# Patient Record
Sex: MALE | Race: WHITE | NOT HISPANIC OR LATINO | Employment: OTHER | URBAN - NONMETROPOLITAN AREA
[De-identification: names, ages, dates, MRNs, and addresses within clinical notes are randomized per-mention and may not be internally consistent; named-entity substitution may affect disease eponyms.]

---

## 2017-06-13 ENCOUNTER — OFFICE VISIT (OUTPATIENT)
Dept: NEUROSURGERY | Facility: CLINIC | Age: 77
End: 2017-06-13

## 2017-06-13 VITALS — WEIGHT: 198 LBS | HEIGHT: 73 IN | BODY MASS INDEX: 26.24 KG/M2

## 2017-06-13 DIAGNOSIS — M50.30 DDD (DEGENERATIVE DISC DISEASE), CERVICAL: Primary | ICD-10-CM

## 2017-06-13 DIAGNOSIS — M50.30 BULGING OF CERVICAL INTERVERTEBRAL DISC: ICD-10-CM

## 2017-06-13 DIAGNOSIS — M25.78 OSTEOPHYTE OF CERVICAL SPINE: ICD-10-CM

## 2017-06-13 PROCEDURE — 99213 OFFICE O/P EST LOW 20 MIN: CPT | Performed by: NEUROLOGICAL SURGERY

## 2017-06-13 RX ORDER — SULFAMETHOXAZOLE AND TRIMETHOPRIM 800; 160 MG/1; MG/1
TABLET ORAL
COMMUNITY
Start: 2017-05-17 | End: 2017-08-08 | Stop reason: HOSPADM

## 2017-06-13 RX ORDER — ROSUVASTATIN CALCIUM 40 MG/1
TABLET, COATED ORAL
COMMUNITY
Start: 2017-06-12

## 2017-06-13 RX ORDER — CARVEDILOL 12.5 MG/1
12.5 TABLET ORAL DAILY
COMMUNITY

## 2017-06-13 RX ORDER — CARVEDILOL 25 MG/1
TABLET ORAL
COMMUNITY
Start: 2017-05-08 | End: 2017-06-13

## 2017-06-13 NOTE — PATIENT INSTRUCTIONS
Degenerative Disk Disease  Degenerative disk disease is a condition caused by the changes that occur in spinal disks as you grow older. Spinal disks are soft and compressible disks located between the bones of your spine (vertebrae). These disks act like shock absorbers. Degenerative disk disease can affect the whole spine. However, the neck and lower back are most commonly affected. Many changes can occur in the spinal disks with aging, such as:  · The spinal disks may dry and shrink.  · Small tears may occur in the tough, outer covering of the disk (annulus).  · The disk space may become smaller due to loss of water.  · Abnormal growths in the bone (spurs) may occur. This can put pressure on the nerve roots exiting the spinal canal, causing pain.  · The spinal canal may become narrowed.  RISK FACTORS   · Being overweight.  · Having a family history of degenerative disk disease.  · Smoking.  · There is increased risk if you are doing heavy lifting or have a sudden injury.  SIGNS AND SYMPTOMS   Symptoms vary from person to person and may include:  · Pain that varies in intensity. Some people have no pain, while others have severe pain. The location of the pain depends on the part of your backbone that is affected.  ¨ You will have neck or arm pain if a disk in the neck area is affected.  ¨ You will have pain in your back, buttocks, or legs if a disk in the lower back is affected.  · Pain that becomes worse while bending, reaching up, or with twisting movements.  · Pain that may start gradually and then get worse as time passes. It may also start after a major or minor injury.  · Numbness or tingling in the arms or legs.  DIAGNOSIS   Your health care provider will ask you about your symptoms and about activities or habits that may cause the pain. He or she may also ask about any injuries, diseases, or treatments you have had. Your health care provider will examine you to check for the range of movement that is  possible in the affected area, to check for strength in your extremities, and to check for sensation in the areas of the arms and legs supplied by different nerve roots. You may also have:   · An X-ray of the spine.  · Other imaging tests, such as MRI.  TREATMENT   Your health care provider will advise you on the best plan for treatment. Treatment may include:  · Medicines.  · Rehabilitation exercises.  HOME CARE INSTRUCTIONS   · Follow proper lifting and walking techniques as advised by your health care provider.  · Maintain good posture.  · Exercise regularly as advised by your health care provider.  · Perform relaxation exercises.  · Change your sitting, standing, and sleeping habits as advised by your health care provider.  · Change positions frequently.  · Lose weight or maintain a healthy weight as advised by your health care provider.  · Do not use any tobacco products, including cigarettes, chewing tobacco, or electronic cigarettes. If you need help quitting, ask your health care provider.  · Wear supportive footwear.  · Take medicines only as directed by your health care provider.  SEEK MEDICAL CARE IF:   · Your pain does not go away within 1-4 weeks.  · You have significant appetite or weight loss.  SEEK IMMEDIATE MEDICAL CARE IF:   · Your pain is severe.  · You notice weakness in your arms, hands, or legs.  · You begin to lose control of your bladder or bowel movements.  · You have fevers or night sweats.  MAKE SURE YOU:   · Understand these instructions.  · Will watch your condition.  · Will get help right away if you are not doing well or get worse.     This information is not intended to replace advice given to you by your health care provider. Make sure you discuss any questions you have with your health care provider.     Document Released: 10/14/2008 Document Revised: 01/08/2016 Document Reviewed: 04/21/2015  [email or web address] Interactive Patient Education ©2017 [email or web address]    Neck Exercises  Neck  exercises can be important for many reasons:   · They can help you to improve and maintain flexibility in your neck. This can be especially important as you age.  · They can help to make your neck stronger. This can make movement easier.  · They can reduce or prevent neck pain.  · They may help your upper back.  Ask your health care provider which neck exercises would be best for you.  EXERCISES TO IMPROVE NECK FLEXIBILITY  Neck Stretch  Repeat this exercise 3-5 times.   1. Do this exercise while standing or while sitting in a chair.  2. Place your feet flat on the floor, shoulder-width apart.  3. Slowly turn your head to the right. Turn it all the way to the right so you can look over your right shoulder. Do not tilt or tip your head.  4. Hold this position for 10-30 seconds.  5. Slowly turn your head to the left, to look over your left shoulder.  6. Hold this position for 10-30 seconds.  Neck Retraction  Repeat this exercise 8-10 times. Do this 3-4 times a day or as told by your health care provider.  1. Do this exercise while standing or while sitting in a sturdy chair.  2. Look straight ahead. Do not bend your neck.  3. Use your fingers to push your chin backward. Do not bend your neck for this movement. Continue to face straight ahead. If you are doing the exercise properly, you will feel a slight sensation in your throat and a stretch at the back of your neck.  4. Hold the stretch for 1-2 seconds. Relax and repeat.  EXERCISES TO IMPROVE NECK STRENGTH  Neck Press   Repeat this exercise 10 times. Do it first thing in the morning and right before bed or as told by your health care provider.  1. Lie on your back on a firm bed or on the floor with a pillow under your head.  2. Use your neck muscles to push your head down on the pillow and straighten your spine.  3. Hold the position as well as you can. Keep your head facing up and your chin tucked.  4. Slowly count to 5 while holding this position.  5. Relax for a  few seconds. Then repeat.  Isometric Strengthening  Do a full set of these exercises 2 times a day or as told by your health care provider.  1. Sit in a supportive chair and place your hand on your forehead.  2. Push forward with your head and neck while pushing back with your hand. Hold for 10 seconds.  3. Relax. Then repeat the exercise 3 times.  4. Next, do the sequence again, this time putting your hand against the back of your head. Use your head and neck to push backward against the hand pressure.  5. Finally, do the same exercise on either side of your head, pushing sideways against the pressure of your hand.  Prone Head Lifts  Repeat this exercise 5 times. Do this 2 times a day or as told by your health care provider.  1. Lie face-down, resting on your elbows so that your chest and upper back are raised.  2. Start with your head facing downward, near your chest. Position your chin either on or near your chest.  3. Slowly lift your head upward. Lift until you are looking straight ahead. Then continue lifting your head as far back as you can stretch.  4. Hold your head up for 5 seconds. Then slowly lower it to your starting position.  Supine Head Lifts  Repeat this exercise 8-10 times. Do this 2 times a day or as told by your health care provider.  1. Lie on your back, bending your knees to point to the ceiling and keeping your feet flat on the floor.  2. Lift your head slowly off the floor, raising your chin toward your chest.  3. Hold for 5 seconds.  4. Relax and repeat.  Scapular Retraction  Repeat this exercise 5 times. Do this 2 times a day or as told by your health care provider.   1. Stand with your arms at your sides. Look straight ahead.  2. Slowly pull both shoulders backward and downward until you feel a stretch between your shoulder blades in your upper back.  3. Hold for 10-30 seconds.  4. Relax and repeat.  SEEK MEDICAL CARE IF:  · Your neck pain or discomfort gets much worse when you do an  exercise.  · Your neck pain or discomfort does not improve within 2 hours after you exercise.  If you have any of these problems, stop exercising right away. Do not do the exercises again unless your health care provider says that you can.  SEEK IMMEDIATE MEDICAL CARE IF:  · You develop sudden, severe neck pain. If this happens, stop exercising right away. Do not do the exercises again unless your health care provider says that you can.     This information is not intended to replace advice given to you by your health care provider. Make sure you discuss any questions you have with your health care provider.     Document Released: 11/28/2016 Document Reviewed: 11/28/2016  [employer] Interactive Patient Education ©2017 [employer]

## 2017-06-13 NOTE — PROGRESS NOTES
Subjective   Patient ID: Valente Vargas Jr. is a 76 y.o. male is being seen for consultation today at the request of Mitch Bedoya*  Chief Complaint: Left sided neck pain    History of Present Illness: The patient is a 76-year-old man with a complaint of left-sided neck pain for the past 3 months.  The pain is nonradicular.  It is not associated with weakness or numbness in the upper extremities or change in gait and the lower extremities.  Skin was done and was reported to the patient that it showed bone spurs.    I saw Mr. Vargas 1 year ago for symptoms of DVT instability and at that time lumbar myelogram showed advanced degenerative disc at L4 5 with prior lumbar fusion at L5-S1, an MRI scan of the head showing no evidence of hydrocephalus.    Review of Radiographic Studies:  MRI scan of cervical spine on 5/18/17 shows degenerative disc change at C4 5 and C5 6 with mild central bulge, but no spinal canal stenosis and no significant foraminal stenosis on either side.  Degenerative changes are also present at C6 7 and C7-T1.    The following portions of the patient's history were reviewed, updated as appropriate and approved: allergies, current medications, past family history, past medical history, past social history, past surgical history, review of systems and problem list.  Review of Systems   Constitutional: Negative for activity change, appetite change, chills, diaphoresis, fatigue, fever and unexpected weight change.   HENT: Negative for congestion, dental problem, drooling, ear discharge, ear pain, facial swelling, hearing loss, mouth sores, nosebleeds, postnasal drip, rhinorrhea, sinus pressure, sneezing, sore throat, tinnitus, trouble swallowing and voice change.    Eyes: Negative for photophobia, pain, discharge, redness, itching and visual disturbance.   Respiratory: Negative for apnea, cough, choking, chest tightness, shortness of breath, wheezing and stridor.    Cardiovascular: Negative  for chest pain, palpitations and leg swelling.   Gastrointestinal: Negative for abdominal distention, abdominal pain, anal bleeding, blood in stool, constipation, diarrhea, nausea, rectal pain and vomiting.   Endocrine: Negative for cold intolerance, heat intolerance, polydipsia, polyphagia and polyuria.   Genitourinary: Negative for decreased urine volume, difficulty urinating, dysuria, enuresis, flank pain, frequency, genital sores, hematuria and urgency.   Musculoskeletal: Positive for back pain, neck pain and neck stiffness. Negative for arthralgias, gait problem, joint swelling and myalgias.   Skin: Negative for color change, pallor, rash and wound.   Allergic/Immunologic: Negative for environmental allergies, food allergies and immunocompromised state.   Neurological: Positive for weakness and headaches. Negative for dizziness, tremors, seizures, syncope, facial asymmetry, speech difficulty, light-headedness and numbness.   Hematological: Negative for adenopathy. Does not bruise/bleed easily.   Psychiatric/Behavioral: Negative for agitation, behavioral problems, confusion, decreased concentration, dysphoric mood, hallucinations, self-injury, sleep disturbance and suicidal ideas. The patient is not nervous/anxious and is not hyperactive.    All other systems reviewed and are negative.      Objective     NEUROLOGICAL EXAMINATION:      MENTAL STATUS:  Alert and oriented.  Speech intact.  Recent and remote memory intact.      CRANIAL NERVES:  Cranial nerve II:  Visual fields are full to confrontation.  Cranial nerves III, IV and VI:  PERRLADC.  Extraocular movements are intact.  Nystagmus is not present.  Cranial nerve V:  Facial sensation is intact to light touch.  Cranial nerve VII:  Muscles of facial expression reveal no asymmetry.  Cranial nerve VIII:  Hearing is intact to finger rub bilaterally.  Cranial nerves IX and X:  Palate elevates symmetrically.  Cranial nerve XI:  Shoulder shrug is intact.  Cranial  nerve XII:  Tongue is midline without evidence of atrophy or fasciculation.    MUSCULOSKELETAL:  No focal tenderness to palpation of the neck.    MOTOR:  Deltoid, biceps, triceps, and  strength intact.  No hand atrophy.  Hip flexion, knee extension, ankle dorsiflexion and plantar flexion intact.  He uses a cane when walking for stability    SENSATION: Intact to touch upper and lower extremities.  Position sense intact.    REFLEXES:  DTR  absent in the upper extremities.    Assessment   Degenerative cervical disc C4 5 and C5 6.  No radiculopathy, myelopathy, or or instability.       Plan   [facility] is the primary treatment for this pain and to continue meloxicam.       Mitch Darnell MD

## 2017-08-05 ENCOUNTER — APPOINTMENT (OUTPATIENT)
Dept: CT IMAGING | Facility: HOSPITAL | Age: 77
End: 2017-08-05

## 2017-08-05 ENCOUNTER — APPOINTMENT (OUTPATIENT)
Dept: MRI IMAGING | Facility: HOSPITAL | Age: 77
End: 2017-08-05

## 2017-08-05 ENCOUNTER — HOSPITAL ENCOUNTER (INPATIENT)
Facility: HOSPITAL | Age: 77
LOS: 2 days | Discharge: HOME | End: 2017-08-08
Attending: EMERGENCY MEDICINE | Admitting: FAMILY MEDICINE

## 2017-08-05 ENCOUNTER — APPOINTMENT (OUTPATIENT)
Dept: GENERAL RADIOLOGY | Facility: HOSPITAL | Age: 77
End: 2017-08-05

## 2017-08-05 DIAGNOSIS — R26.9 GAIT DISTURBANCE: ICD-10-CM

## 2017-08-05 DIAGNOSIS — R53.1 GENERALIZED WEAKNESS: ICD-10-CM

## 2017-08-05 DIAGNOSIS — D72.829 LEUKOCYTOSIS, UNSPECIFIED TYPE: ICD-10-CM

## 2017-08-05 DIAGNOSIS — K57.32 DIVERTICULITIS OF LARGE INTESTINE WITHOUT PERFORATION OR ABSCESS WITHOUT BLEEDING: Primary | ICD-10-CM

## 2017-08-05 DIAGNOSIS — R50.9 FEVER IN ADULT: ICD-10-CM

## 2017-08-05 DIAGNOSIS — Z74.09 IMPAIRED MOBILITY AND ADLS: ICD-10-CM

## 2017-08-05 DIAGNOSIS — Z74.09 IMPAIRED FUNCTIONAL MOBILITY, BALANCE, GAIT, AND ENDURANCE: ICD-10-CM

## 2017-08-05 DIAGNOSIS — Z78.9 IMPAIRED MOBILITY AND ADLS: ICD-10-CM

## 2017-08-05 LAB
ALBUMIN SERPL-MCNC: 3.8 G/DL (ref 3.2–4.8)
ALBUMIN/GLOB SERPL: 1.2 G/DL (ref 1.5–2.5)
ALP SERPL-CCNC: 38 U/L (ref 25–100)
ALT SERPL W P-5'-P-CCNC: 9 U/L (ref 7–40)
ANION GAP SERPL CALCULATED.3IONS-SCNC: 5 MMOL/L (ref 3–11)
AST SERPL-CCNC: 15 U/L (ref 0–33)
BASOPHILS # BLD AUTO: 0.03 10*3/MM3 (ref 0–0.2)
BASOPHILS NFR BLD AUTO: 0.2 % (ref 0–1)
BILIRUB SERPL-MCNC: 0.6 MG/DL (ref 0.3–1.2)
BILIRUB UR QL STRIP: NEGATIVE
BNP SERPL-MCNC: 19 PG/ML (ref 0–100)
BUN BLD-MCNC: 17 MG/DL (ref 9–23)
BUN/CREAT SERPL: 17 (ref 7–25)
CALCIUM SPEC-SCNC: 8.8 MG/DL (ref 8.7–10.4)
CHLORIDE SERPL-SCNC: 101 MMOL/L (ref 99–109)
CLARITY UR: CLEAR
CO2 SERPL-SCNC: 29 MMOL/L (ref 20–31)
COLOR UR: YELLOW
CREAT BLD-MCNC: 1 MG/DL (ref 0.6–1.3)
D-LACTATE SERPL-SCNC: 1.2 MMOL/L (ref 0.5–2)
DEPRECATED RDW RBC AUTO: 47.1 FL (ref 37–54)
EOSINOPHIL # BLD AUTO: 0.12 10*3/MM3 (ref 0–0.3)
EOSINOPHIL NFR BLD AUTO: 0.8 % (ref 0–3)
ERYTHROCYTE [DISTWIDTH] IN BLOOD BY AUTOMATED COUNT: 13.7 % (ref 11.3–14.5)
GFR SERPL CREATININE-BSD FRML MDRD: 72 ML/MIN/1.73
GLOBULIN UR ELPH-MCNC: 3.1 GM/DL
GLUCOSE BLD-MCNC: 128 MG/DL (ref 70–100)
GLUCOSE UR STRIP-MCNC: NEGATIVE MG/DL
HCT VFR BLD AUTO: 44.1 % (ref 38.9–50.9)
HGB BLD-MCNC: 15.3 G/DL (ref 13.1–17.5)
HGB UR QL STRIP.AUTO: ABNORMAL
IMM GRANULOCYTES # BLD: 0.05 10*3/MM3 (ref 0–0.03)
IMM GRANULOCYTES NFR BLD: 0.3 % (ref 0–0.6)
KETONES UR QL STRIP: ABNORMAL
LEUKOCYTE ESTERASE UR QL STRIP.AUTO: NEGATIVE
LYMPHOCYTES # BLD AUTO: 3.52 10*3/MM3 (ref 0.6–4.8)
LYMPHOCYTES NFR BLD AUTO: 22.3 % (ref 24–44)
MCH RBC QN AUTO: 32.8 PG (ref 27–31)
MCHC RBC AUTO-ENTMCNC: 34.7 G/DL (ref 32–36)
MCV RBC AUTO: 94.4 FL (ref 80–99)
MONOCYTES # BLD AUTO: 2.13 10*3/MM3 (ref 0–1)
MONOCYTES NFR BLD AUTO: 13.5 % (ref 0–12)
NEUTROPHILS # BLD AUTO: 9.93 10*3/MM3 (ref 1.5–8.3)
NEUTROPHILS NFR BLD AUTO: 62.9 % (ref 41–71)
NITRITE UR QL STRIP: NEGATIVE
PH UR STRIP.AUTO: 7.5 [PH] (ref 5–8)
PLATELET # BLD AUTO: 183 10*3/MM3 (ref 150–450)
PMV BLD AUTO: 10.4 FL (ref 6–12)
POTASSIUM BLD-SCNC: 4.1 MMOL/L (ref 3.5–5.5)
PROT SERPL-MCNC: 6.9 G/DL (ref 5.7–8.2)
PROT UR QL STRIP: ABNORMAL
RBC # BLD AUTO: 4.67 10*6/MM3 (ref 4.2–5.76)
SODIUM BLD-SCNC: 135 MMOL/L (ref 132–146)
SP GR UR STRIP: 1.02 (ref 1–1.03)
TROPONIN I SERPL-MCNC: 0.02 NG/ML (ref 0–0.07)
TSH SERPL DL<=0.05 MIU/L-ACNC: 1.72 MIU/ML (ref 0.35–5.35)
UROBILINOGEN UR QL STRIP: ABNORMAL
VALPROATE SERPL-MCNC: 50 MCG/ML (ref 50–150)
WBC NRBC COR # BLD: 15.78 10*3/MM3 (ref 3.5–10.8)

## 2017-08-05 PROCEDURE — 80164 ASSAY DIPROPYLACETIC ACD TOT: CPT | Performed by: NURSE PRACTITIONER

## 2017-08-05 PROCEDURE — 71010 HC CHEST PA OR AP: CPT

## 2017-08-05 PROCEDURE — 87040 BLOOD CULTURE FOR BACTERIA: CPT | Performed by: NURSE PRACTITIONER

## 2017-08-05 PROCEDURE — 80053 COMPREHEN METABOLIC PANEL: CPT | Performed by: NURSE PRACTITIONER

## 2017-08-05 PROCEDURE — 81001 URINALYSIS AUTO W/SCOPE: CPT | Performed by: NURSE PRACTITIONER

## 2017-08-05 PROCEDURE — 87086 URINE CULTURE/COLONY COUNT: CPT | Performed by: NURSE PRACTITIONER

## 2017-08-05 PROCEDURE — 81003 URINALYSIS AUTO W/O SCOPE: CPT | Performed by: NURSE PRACTITIONER

## 2017-08-05 PROCEDURE — 25010000002 LEVOFLOXACIN PER 250 MG: Performed by: NURSE PRACTITIONER

## 2017-08-05 PROCEDURE — 84484 ASSAY OF TROPONIN QUANT: CPT

## 2017-08-05 PROCEDURE — 84443 ASSAY THYROID STIM HORMONE: CPT | Performed by: NURSE PRACTITIONER

## 2017-08-05 PROCEDURE — 83880 ASSAY OF NATRIURETIC PEPTIDE: CPT | Performed by: NURSE PRACTITIONER

## 2017-08-05 PROCEDURE — 83605 ASSAY OF LACTIC ACID: CPT | Performed by: NURSE PRACTITIONER

## 2017-08-05 PROCEDURE — 72148 MRI LUMBAR SPINE W/O DYE: CPT

## 2017-08-05 PROCEDURE — 99285 EMERGENCY DEPT VISIT HI MDM: CPT

## 2017-08-05 PROCEDURE — 74176 CT ABD & PELVIS W/O CONTRAST: CPT

## 2017-08-05 PROCEDURE — 93005 ELECTROCARDIOGRAM TRACING: CPT | Performed by: NURSE PRACTITIONER

## 2017-08-05 PROCEDURE — 25010000002 LORAZEPAM PER 2 MG: Performed by: EMERGENCY MEDICINE

## 2017-08-05 PROCEDURE — 85025 COMPLETE CBC W/AUTO DIFF WBC: CPT | Performed by: NURSE PRACTITIONER

## 2017-08-05 RX ORDER — LORAZEPAM 2 MG/ML
1 INJECTION INTRAMUSCULAR ONCE
Status: COMPLETED | OUTPATIENT
Start: 2017-08-05 | End: 2017-08-05

## 2017-08-05 RX ORDER — LEVOFLOXACIN 5 MG/ML
750 INJECTION, SOLUTION INTRAVENOUS ONCE
Status: COMPLETED | OUTPATIENT
Start: 2017-08-05 | End: 2017-08-06

## 2017-08-05 RX ORDER — ACETAMINOPHEN 325 MG/1
650 TABLET ORAL EVERY 6 HOURS PRN
Status: DISCONTINUED | OUTPATIENT
Start: 2017-08-05 | End: 2017-08-08 | Stop reason: HOSPADM

## 2017-08-05 RX ORDER — ACETAMINOPHEN 500 MG
1000 TABLET ORAL ONCE
Status: DISCONTINUED | OUTPATIENT
Start: 2017-08-05 | End: 2017-08-05

## 2017-08-05 RX ORDER — SODIUM CHLORIDE 0.9 % (FLUSH) 0.9 %
10 SYRINGE (ML) INJECTION AS NEEDED
Status: DISCONTINUED | OUTPATIENT
Start: 2017-08-05 | End: 2017-08-08 | Stop reason: HOSPADM

## 2017-08-05 RX ORDER — SODIUM CHLORIDE 9 MG/ML
125 INJECTION, SOLUTION INTRAVENOUS CONTINUOUS
Status: DISCONTINUED | OUTPATIENT
Start: 2017-08-05 | End: 2017-08-07

## 2017-08-05 RX ADMIN — LORAZEPAM 1 MG: 2 INJECTION INTRAMUSCULAR; INTRAVENOUS at 21:16

## 2017-08-05 RX ADMIN — ACETAMINOPHEN 650 MG: 325 TABLET, FILM COATED ORAL at 22:53

## 2017-08-05 RX ADMIN — SODIUM CHLORIDE 125 ML/HR: 9 INJECTION, SOLUTION INTRAVENOUS at 23:21

## 2017-08-05 RX ADMIN — LEVOFLOXACIN 750 MG: 5 INJECTION, SOLUTION INTRAVENOUS at 23:39

## 2017-08-06 PROBLEM — Z72.0 TOBACCO ABUSE: Status: ACTIVE | Noted: 2017-08-06

## 2017-08-06 PROBLEM — I71.40 ABDOMINAL AORTIC ANEURYSM (HCC): Status: ACTIVE | Noted: 2017-08-06

## 2017-08-06 PROBLEM — Z99.81 ON SUPPLEMENTAL OXYGEN THERAPY: Status: ACTIVE | Noted: 2017-08-06

## 2017-08-06 PROBLEM — A41.9 SEPSIS (HCC): Status: ACTIVE | Noted: 2017-08-06

## 2017-08-06 PROBLEM — D72.829 LEUKOCYTOSIS: Status: ACTIVE | Noted: 2017-08-06

## 2017-08-06 PROBLEM — I10 HYPERTENSION: Status: ACTIVE | Noted: 2017-08-06

## 2017-08-06 PROBLEM — E78.5 HYPERLIPIDEMIA: Status: ACTIVE | Noted: 2017-08-06

## 2017-08-06 PROBLEM — K21.9 GERD (GASTROESOPHAGEAL REFLUX DISEASE): Status: ACTIVE | Noted: 2017-08-06

## 2017-08-06 PROBLEM — E03.9 HYPOTHYROIDISM: Status: ACTIVE | Noted: 2017-08-06

## 2017-08-06 PROBLEM — K57.32 DIVERTICULITIS OF LARGE INTESTINE WITHOUT PERFORATION OR ABSCESS WITHOUT BLEEDING: Status: ACTIVE | Noted: 2017-08-06

## 2017-08-06 PROBLEM — E11.9 TYPE 2 DIABETES MELLITUS (HCC): Status: ACTIVE | Noted: 2017-08-06

## 2017-08-06 LAB
ANION GAP SERPL CALCULATED.3IONS-SCNC: 5 MMOL/L (ref 3–11)
BACTERIA UR QL AUTO: ABNORMAL /HPF
BASOPHILS # BLD AUTO: 0.03 10*3/MM3 (ref 0–0.2)
BASOPHILS NFR BLD AUTO: 0.2 % (ref 0–1)
BUN BLD-MCNC: 13 MG/DL (ref 9–23)
BUN/CREAT SERPL: 13 (ref 7–25)
CALCIUM SPEC-SCNC: 8.5 MG/DL (ref 8.7–10.4)
CHLORIDE SERPL-SCNC: 105 MMOL/L (ref 99–109)
CO2 SERPL-SCNC: 24 MMOL/L (ref 20–31)
CREAT BLD-MCNC: 1 MG/DL (ref 0.6–1.3)
DEPRECATED RDW RBC AUTO: 48.6 FL (ref 37–54)
EOSINOPHIL # BLD AUTO: 0.15 10*3/MM3 (ref 0–0.3)
EOSINOPHIL NFR BLD AUTO: 1.2 % (ref 0–3)
ERYTHROCYTE [DISTWIDTH] IN BLOOD BY AUTOMATED COUNT: 13.9 % (ref 11.3–14.5)
GFR SERPL CREATININE-BSD FRML MDRD: 72 ML/MIN/1.73
GLUCOSE BLD-MCNC: 110 MG/DL (ref 70–100)
HCT VFR BLD AUTO: 41.4 % (ref 38.9–50.9)
HGB BLD-MCNC: 14.1 G/DL (ref 13.1–17.5)
HYALINE CASTS UR QL AUTO: ABNORMAL /LPF
IMM GRANULOCYTES # BLD: 0.03 10*3/MM3 (ref 0–0.03)
IMM GRANULOCYTES NFR BLD: 0.2 % (ref 0–0.6)
LYMPHOCYTES # BLD AUTO: 3.01 10*3/MM3 (ref 0.6–4.8)
LYMPHOCYTES NFR BLD AUTO: 23.9 % (ref 24–44)
MCH RBC QN AUTO: 32.4 PG (ref 27–31)
MCHC RBC AUTO-ENTMCNC: 34.1 G/DL (ref 32–36)
MCV RBC AUTO: 95.2 FL (ref 80–99)
MONOCYTES # BLD AUTO: 1.78 10*3/MM3 (ref 0–1)
MONOCYTES NFR BLD AUTO: 14.2 % (ref 0–12)
MUCOUS THREADS URNS QL MICRO: ABNORMAL /HPF
NEUTROPHILS # BLD AUTO: 7.57 10*3/MM3 (ref 1.5–8.3)
NEUTROPHILS NFR BLD AUTO: 60.3 % (ref 41–71)
PLATELET # BLD AUTO: 162 10*3/MM3 (ref 150–450)
PMV BLD AUTO: 10.3 FL (ref 6–12)
POTASSIUM BLD-SCNC: 4 MMOL/L (ref 3.5–5.5)
RBC # BLD AUTO: 4.35 10*6/MM3 (ref 4.2–5.76)
RBC # UR: ABNORMAL /HPF
REF LAB TEST METHOD: ABNORMAL
SODIUM BLD-SCNC: 134 MMOL/L (ref 132–146)
SQUAMOUS #/AREA URNS HPF: ABNORMAL /HPF
WBC NRBC COR # BLD: 12.57 10*3/MM3 (ref 3.5–10.8)
WBC UR QL AUTO: ABNORMAL /HPF
YEAST URNS QL MICRO: ABNORMAL /HPF

## 2017-08-06 PROCEDURE — 80048 BASIC METABOLIC PNL TOTAL CA: CPT | Performed by: PHYSICIAN ASSISTANT

## 2017-08-06 PROCEDURE — 25010000002 PIPERACILLIN SOD-TAZOBACTAM PER 1 G: Performed by: PHYSICIAN ASSISTANT

## 2017-08-06 PROCEDURE — 85025 COMPLETE CBC W/AUTO DIFF WBC: CPT | Performed by: PHYSICIAN ASSISTANT

## 2017-08-06 PROCEDURE — 99223 1ST HOSP IP/OBS HIGH 75: CPT | Performed by: FAMILY MEDICINE

## 2017-08-06 RX ORDER — ACETAMINOPHEN 325 MG/1
650 TABLET ORAL EVERY 4 HOURS PRN
Status: DISCONTINUED | OUTPATIENT
Start: 2017-08-06 | End: 2017-08-08 | Stop reason: HOSPADM

## 2017-08-06 RX ORDER — LEVOTHYROXINE SODIUM 0.05 MG/1
50 TABLET ORAL DAILY
Status: DISCONTINUED | OUTPATIENT
Start: 2017-08-06 | End: 2017-08-08 | Stop reason: HOSPADM

## 2017-08-06 RX ORDER — DIVALPROEX SODIUM 500 MG/1
500 TABLET, DELAYED RELEASE ORAL EVERY 12 HOURS SCHEDULED
Status: DISCONTINUED | OUTPATIENT
Start: 2017-08-06 | End: 2017-08-08 | Stop reason: HOSPADM

## 2017-08-06 RX ORDER — CLOPIDOGREL BISULFATE 75 MG/1
75 TABLET ORAL DAILY
Status: DISCONTINUED | OUTPATIENT
Start: 2017-08-06 | End: 2017-08-08 | Stop reason: HOSPADM

## 2017-08-06 RX ORDER — CITALOPRAM 20 MG/1
40 TABLET ORAL DAILY
Status: DISCONTINUED | OUTPATIENT
Start: 2017-08-06 | End: 2017-08-08 | Stop reason: HOSPADM

## 2017-08-06 RX ORDER — ROSUVASTATIN CALCIUM 20 MG/1
40 TABLET, COATED ORAL NIGHTLY
Status: DISCONTINUED | OUTPATIENT
Start: 2017-08-06 | End: 2017-08-08 | Stop reason: HOSPADM

## 2017-08-06 RX ORDER — SODIUM CHLORIDE 9 MG/ML
100 INJECTION, SOLUTION INTRAVENOUS CONTINUOUS
Status: ACTIVE | OUTPATIENT
Start: 2017-08-06 | End: 2017-08-06

## 2017-08-06 RX ORDER — PANTOPRAZOLE SODIUM 40 MG/1
40 TABLET, DELAYED RELEASE ORAL EVERY MORNING
Status: DISCONTINUED | OUTPATIENT
Start: 2017-08-06 | End: 2017-08-08 | Stop reason: HOSPADM

## 2017-08-06 RX ORDER — CARVEDILOL 12.5 MG/1
12.5 TABLET ORAL DAILY
Status: DISCONTINUED | OUTPATIENT
Start: 2017-08-06 | End: 2017-08-08 | Stop reason: HOSPADM

## 2017-08-06 RX ORDER — RAMIPRIL 2.5 MG/1
10 CAPSULE ORAL DAILY
Status: DISCONTINUED | OUTPATIENT
Start: 2017-08-06 | End: 2017-08-08 | Stop reason: HOSPADM

## 2017-08-06 RX ORDER — ONDANSETRON 2 MG/ML
4 INJECTION INTRAMUSCULAR; INTRAVENOUS EVERY 6 HOURS PRN
Status: DISCONTINUED | OUTPATIENT
Start: 2017-08-06 | End: 2017-08-08 | Stop reason: HOSPADM

## 2017-08-06 RX ORDER — SODIUM CHLORIDE 0.9 % (FLUSH) 0.9 %
1-10 SYRINGE (ML) INJECTION AS NEEDED
Status: DISCONTINUED | OUTPATIENT
Start: 2017-08-06 | End: 2017-08-08 | Stop reason: HOSPADM

## 2017-08-06 RX ORDER — ONDANSETRON 4 MG/1
4 TABLET, FILM COATED ORAL EVERY 6 HOURS PRN
Status: DISCONTINUED | OUTPATIENT
Start: 2017-08-06 | End: 2017-08-08 | Stop reason: HOSPADM

## 2017-08-06 RX ADMIN — METRONIDAZOLE 500 MG: 500 INJECTION, SOLUTION INTRAVENOUS at 14:39

## 2017-08-06 RX ADMIN — TAZOBACTAM SODIUM AND PIPERACILLIN SODIUM 3.38 G: 375; 3 INJECTION, SOLUTION INTRAVENOUS at 17:54

## 2017-08-06 RX ADMIN — METRONIDAZOLE 500 MG: 500 INJECTION, SOLUTION INTRAVENOUS at 06:19

## 2017-08-06 RX ADMIN — CARVEDILOL 12.5 MG: 12.5 TABLET, FILM COATED ORAL at 09:13

## 2017-08-06 RX ADMIN — METRONIDAZOLE 500 MG: 500 INJECTION, SOLUTION INTRAVENOUS at 01:31

## 2017-08-06 RX ADMIN — DIVALPROEX SODIUM 500 MG: 500 TABLET, DELAYED RELEASE ORAL at 09:13

## 2017-08-06 RX ADMIN — SODIUM CHLORIDE 100 ML/HR: 9 INJECTION, SOLUTION INTRAVENOUS at 06:18

## 2017-08-06 RX ADMIN — RAMIPRIL 10 MG: 2.5 CAPSULE ORAL at 09:13

## 2017-08-06 RX ADMIN — ROSUVASTATIN CALCIUM 40 MG: 20 TABLET, FILM COATED ORAL at 21:49

## 2017-08-06 RX ADMIN — METRONIDAZOLE 500 MG: 500 INJECTION, SOLUTION INTRAVENOUS at 21:46

## 2017-08-06 RX ADMIN — QUETIAPINE FUMARATE 300 MG: 200 TABLET, EXTENDED RELEASE ORAL at 21:44

## 2017-08-06 RX ADMIN — PANTOPRAZOLE SODIUM 40 MG: 40 TABLET, DELAYED RELEASE ORAL at 09:13

## 2017-08-06 RX ADMIN — CITALOPRAM HYDROBROMIDE 40 MG: 20 TABLET ORAL at 09:13

## 2017-08-06 RX ADMIN — TAZOBACTAM SODIUM AND PIPERACILLIN SODIUM 3.38 G: 375; 3 INJECTION, SOLUTION INTRAVENOUS at 11:45

## 2017-08-06 RX ADMIN — LEVOTHYROXINE SODIUM 50 MCG: 50 TABLET ORAL at 06:19

## 2017-08-06 RX ADMIN — DIVALPROEX SODIUM 500 MG: 500 TABLET, DELAYED RELEASE ORAL at 21:45

## 2017-08-06 RX ADMIN — CLOPIDOGREL BISULFATE 75 MG: 75 TABLET ORAL at 09:13

## 2017-08-06 RX ADMIN — TAZOBACTAM SODIUM AND PIPERACILLIN SODIUM 3.38 G: 375; 3 INJECTION, SOLUTION INTRAVENOUS at 06:19

## 2017-08-06 NOTE — PROGRESS NOTES
"      HOSPITALIST DAILY PROGRESS NOTE    Chief Complaint: weakness    Subjective   SUBJECTIVE/OVERNIGHT EVENTS     Feeling better this am, family at bedside.  Admitted earlier today by my colleague, Dr. Weldon.  Review of Systems:  Gen-no fevers since initial recorded one  CV-no chest pain, no palpitations  Resp-no cough, no dyspnea  GI-no N/V/D, no abd pain    Objective   OBJECTIVE   I have reviewed the vital signs.  /68 (BP Location: Right arm, Patient Position: Lying)  Pulse 67  Temp 98.2 °F (36.8 °C) (Oral)   Resp 20  Ht 73\" (185.4 cm)  Wt 196 lb (88.9 kg)  SpO2 96%  BMI 25.86 kg/m2    Physical Exam:  Gen-no acute distress  CV-RRR, S1 S2 normal, no m/r/g  Resp-CTAB, no wheezes  Abd-soft, NT, ND, +BS  Ext-no edema  Neuro-A&Ox3, no focal deficits  Psych-appropriate mood    Results:  I have reviewed the labs, culture data, radiology results, and diagnostic studies.      Results from last 7 days  Lab Units 08/06/17  0855 08/05/17  2054   WBC 10*3/mm3 12.57* 15.78*   HEMOGLOBIN g/dL 14.1 15.3   HEMATOCRIT % 41.4 44.1   PLATELETS 10*3/mm3 162 183       Results from last 7 days  Lab Units 08/06/17  0855   SODIUM mmol/L 134   POTASSIUM mmol/L 4.0   CHLORIDE mmol/L 105   CO2 mmol/L 24.0   BUN mg/dL 13   CREATININE mg/dL 1.00   GLUCOSE mg/dL 110*   CALCIUM mg/dL 8.5*       Culture Data:  Cultures:           Radiology Results:  Imaging Results (last 24 hours)     Procedure Component Value Units Date/Time    XR Chest 1 View [[id number]]  (Abnormal) Collected:  08/05/17 1919     Updated:  08/05/17 2120    Narrative:       EXAM:    XR Chest, 1 View    CLINICAL HISTORY:    76 years, male; Signs and symptoms; Fever; Additional info: Weakness/fever    TECHNIQUE:    Frontal view of the chest.    COMPARISON:    CR - XR CHEST PA AND LATERAL 1/14/2013 1:00:59 PM    FINDINGS:    Lungs:  Unremarkable.  No consolidation.    Pleural space:  Unremarkable.  No pneumothorax.    Heart:  Unremarkable.  No cardiomegaly.    " Mediastinum:  Unremarkable.    Bones/joints:  No acute osseous findings.    Vasculature:  Atherosclerotic calcifications are visualized within the aorta.      Impression:         No acute findings visualized in the chest.     THIS DOCUMENT HAS BEEN ELECTRONICALLY SIGNED BY APOLINAR JENNINGS MD    MRI Lumbar Spine Without Contrast [[id number]]  (Abnormal) Collected:  08/05/17 1922     Updated:  08/05/17 2218    Narrative:       EXAM:    MR Lumbar Spine Without Intravenous Contrast    CLINICAL HISTORY:    76 years, male; Signs and symptoms; Weakness; Prior surgery; Surgery date: 6+   months; Surgery type: Fusion; Patient HX: Leg weakness/spinal stenosis lbo x 3   years lumbar surgery in 1980s - fusion    TECHNIQUE:    Magnetic resonance images of the lumbar spine without intravenous contrast in   multiple planes.    COMPARISON:    CT LUMBAR SPINE WO CONTRAST 5/9/2016 10:23:55 AM    FINDINGS:    Vertebrae:  Discogenic degenerative changes visualized within the vertebral   body endplates at L4-5.  No acute fracture.  No suspicious osseous lesion.    Spinal cord: Distal spinal cord appears unremarkable.  Normal signal.    Soft tissues:  Minimal nonspecific subcutaneous stranding at the level lower   back.  No fluid collections visualized.    Vasculature:  Abdominal aortic aneurysm again noted.  This is incompletely   imaged and cannot be fully evaluated.    Kidneys and ureters:  Small left renal cyst.     DISCS/SPINAL CANAL/NEURAL FORAMINA:    L1-L2:  At L1-2, there is no significant disc disease.  Facet hypertrophy is   noted.  No spinal canal or neural foraminal narrowing.    L2-L3:  At L2-3, no significant disc disease.  Facet hypertrophy noted.  No   significant spinal canal or neural foraminal narrowing.    L3-L4:  At L3-4, there is a broad-based posterior disc bulge and facet   hypertrophy.  Small amount of fluid noted in the bilateral facet joints at this   level.  There is associated mild spinal canal narrowing.  Mild  bilateral neural   foraminal narrowing.    L4-L5:  At L4-5, there is prominent intervertebral disc height loss with a   broad-based posterior disc bulge and facet hypertrophy.  Mild ligamentum flavum   hypertrophy.  Mild to moderate spinal canal narrowing.  Moderate bilateral   neural foraminal narrowing.  This appears similar to the prior study.    L5-S1:  At L5-S1, there is prominent intervertebral disc height loss.    Minimal broad-based posterior disc bulge.  No significant spinal canal   narrowing.  Mild left neural foraminal narrowing and moderate right neural   foraminal narrowing.      Impression:       Degenerative changes of the lumbar spine, most prominent at L4-5.  No   significant interval change appreciated.    THIS DOCUMENT HAS BEEN ELECTRONICALLY SIGNED BY APOLINAR JENNINGS MD    CT Abdomen Pelvis Without Contrast [[id number]]  (Abnormal) Collected:  08/05/17 2216     Updated:  08/05/17 2307    Narrative:       EXAM:    CT Abdomen and Pelvis Without Intravenous Contrast    CLINICAL HISTORY:    76 years, male; Signs and symptoms; Other: Weakness; Prior surgery;   Additional info: Abd pain    TECHNIQUE:    Axial computed tomography images of the abdomen and pelvis without   intravenous contrast.  This CT exam was performed using one or more of the   following dose reduction techniques:  automated exposure control, adjustment of   the mA and/or kV according to patient size, and/or use of iterative   reconstruction technique.    Coronal reformatted images were created and reviewed.    COMPARISON:    CT LUMBAR SPINE WO CONTRAST 5/9/2016 10:23:55 AM    FINDINGS:    Lower thorax:  There is mild subsegmental atelectasis and/or scarring in the   lung bases.     ABDOMEN:    Liver:  Unremarkable. No obvious liver masses appreciated on this   non-contrast exam.    Gallbladder and bile ducts:  The gallbladder is surgically absent. No   significant biliary ductal dilatation appreciated.    Pancreas:  Unremarkable.  No  ductal dilation.    Spleen:  Unremarkable.  No splenomegaly.    Adrenals:  Unremarkable.  No adrenal nodules or masses identified.    Kidneys and ureters:  Left renal cysts.  Small hyperdense lesions in the   right kidney measuring up to 1.4 cm are favored to represent hyperdense cysts.    No hydronephrosis.  Punctate bilateral intrarenal stones visualized.  No   ureteral stones.  There is bilateral perinephric stranding which may be chronic.    Stomach and bowel:  No bowel obstruction. Colonic diverticulosis is noted.    There is also focal wall thickening and mild inflammatory changes at the level   of the descending colon, most compatible with diverticulitis.      Appendix:  The appendix is unremarkable.     PELVIS:    Bladder:  Unremarkable.  No stones.    Reproductive:  The prostate is mildly enlarged.     ABDOMEN and PELVIS:    Intraperitoneal space:   No free air.  Trace free fluid in the left paracolic   gutter.  No focal fluid collection to suggest abscess.    Bones/joints:  Degenerative changes of the spine. No acute fracture.  No   dislocation.    Soft tissues:  Tiny fat-containing ventral hernia at the level of the upper   abdomen.  There is also a small fat containing umbilical hernia.  Small fat   containing right inguinal hernia.    Vasculature:  Atherosclerotic calcifications.  There is an infrarenal aortic   aneurysm.  This measures 4.9 x 4.9 cm in the AP and transverse dimensions.  The   transverse diameter is not significantly changed from the prior study.  The AP   diameter on the prior exam is incompletely imaged.  This aneurysm extends   approximately 9.5 cm in the craniocaudal dimension.  No obvious signs of aortic   rupture.  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    Lymph nodes:  No significant lymph node enlargement.      Impression:       1.  Findings compatible with diverticulitis at the level of the descending   colon. No evidence of associated perforation or abscess.    2.  Abdominal aortic aneurysm measuring 4.9 cm in diameter.  No findings to   suggest aortic rupture.    THIS DOCUMENT HAS BEEN ELECTRONICALLY SIGNED BY APOLINAR JENNINGS MD          I have reviewed the medications.      Assessment/Plan   ASSESSMENT/PLAN    Principal Problem:    Sepsis due to Diverticulitis  Active Problems:    Leukocytosis    DDD (degenerative disc disease), lumbar    DDD (degenerative disc disease), cervical    GERD (gastroesophageal reflux disease)    Hypothyroidism    Hyperlipidemia    Type 2 diabetes mellitus    Tobacco abuse    Abdominal aortic aneurysm    On supplemental oxygen therapy, nightly    Hypertension    Mr. Vargas is a 77 yo WM w/ PMH of diverticulitis four years ago, HTN, HLD, AAA and hypothyroidism who presents with sepsis (fever and leukocytosis) due to diverticulitis.    Plan:  --continue IVFs, IV ABX.  Await culture results. Transition to PO ABX in am if remains afebrile  --awaiting records from [place] admission for similar issue four years ago.  -- will need outpatient follow up of AAA  --nicotine patch while inpatient      I expect patient to be discharged in: 2-3 days to home    Suze Vergara MD  08/06/17  12:39 PM

## 2017-08-06 NOTE — H&P
"    [facility] Medicine Services  HISTORY AND PHYSICAL    Primary Care Physician: Mitch Claire MD    Subjective     Chief Complaint: weakness    History of Present Illness:   This is a 76 year old male with a past medical history significant for GERD, HLD, DDD, hypothyroidism, DM2, and probable TOYIN who presents with complaints of progressively worsening generalized weakness and fatigue. He has had this issue for the past 2-3 years. States his legs intermittently \"give out\" and later regain baseline strength. Today, weakness was markedly increased per usual. Patient reports he was getting up from a chair when he nearly fell when legs suddenly gave out. Has required assistant from family to ambulate. No complaints of syncope, dysuria, abdominal pain,fever, cough, congestion, or chest pain. Patient notes that weakness is usually exacerbated by acute illness. Now reporting to [facility] for further evaluation and treatment.     Emergency Department Evaluation: Febrile. T-Max 101.9. Hypertensive, /105. Vitals otherwise stable. Blood glucose borderline at 128.0 Lactic acid 1.2. Chemistry and hematology otherwise favorable. Urinalysis and CXR negative. CT abdomen and pelvis impressive for; diverticulitis at the level of the descending colon and Abdominal aortic aneurysm measuring 4.9 cm in diameter.     Review of Systems   Constitutional: Positive for fatigue. Negative for fever.   HENT: Negative for congestion and trouble swallowing.    Eyes: Negative for photophobia and visual disturbance.   Respiratory: Negative for cough and shortness of breath.    Cardiovascular: Negative for chest pain.   Gastrointestinal: Negative for abdominal pain, diarrhea, nausea and vomiting.   Endocrine: Negative for cold intolerance and heat intolerance.   Genitourinary: Negative for dysuria and frequency.   Musculoskeletal: Positive for arthralgias and back pain.   Skin: Negative for pallor and rash. " "  Allergic/Immunologic: Negative for immunocompromised state.   Neurological: Positive for weakness. Negative for dizziness and syncope.   Hematological: Negative for adenopathy.   Psychiatric/Behavioral: Negative for agitation and confusion.      Otherwise complete ROS performed and negative except as mentioned in the HPI.    Past Medical History:   Diagnosis Date   • Diabetes mellitus    • Disease of thyroid gland    • Diverticulitis    • GERD (gastroesophageal reflux disease)    • Hypertension    • Stroke     TIA's- 2012       Past Surgical History:   Procedure Laterality Date   • BACK SURGERY N/A     L5-S1 fusion    • CARDIAC CATHETERIZATION         Family History   Problem Relation Age of Onset   • Stroke Mother    • Diabetes Mother        Social History     Social History   • Marital status:      Spouse name: N/A   • Number of children: N/A   • Years of education: N/A     Occupational History   • Not on file.     Social History Main Topics   • Smoking status: Current Every Day Smoker     Packs/day: 1.00     Types: Cigarettes   • Smokeless tobacco: Not on file   • Alcohol use No   • Drug use: No   • Sexual activity: Defer     Other Topics Concern   • Not on file     Social History Narrative       Medications:    (Not in a hospital admission)    Allergies:  Allergies   Allergen Reactions   • Codeine          Objective     Physical Exam:  Vital Signs: BP (!) 128/105  Pulse 74  Temp (!) 101.9 °F (38.8 °C) (Oral)   Resp 20  Ht 73\" (185.4 cm)  Wt 200 lb (90.7 kg)  SpO2 97%  BMI 26.39 kg/m2  Physical Exam  Temp:  [100.4 °F (38 °C)-101.9 °F (38.8 °C)] 101.9 °F (38.8 °C)  Heart Rate:  [73-80] 74  Resp:  [20] 20  BP: (128-146)/() 128/105  Constitutional: no acute distress, awake, alert  Eyes: PERRLA, sclerae anicteric, no conjunctival injection  Neck: supple, no thyromegaly, trachea midline  Respiratory: Clear to auscultation bilaterally, nonlabored respirations   Cardiovascular: RRR, no murmurs, " "rubs, or gallops, palpable pedal pulses bilaterally  Gastrointestinal: Positive bowel sounds, soft, nontender, nondistended  Musculoskeletal: No bilateral ankle edema, no clubbing or cyanosis to bilateral lower extremities  Psychiatric: oriented x 3, appropriate affect, cooperative  Neurologic: Strength symmetric in all extremities, Cranial Nerves grossly intact to confrontation         Results Reviewed:    Results from last 7 days  Lab Units 08/05/17  2054   WBC 10*3/mm3 15.78*   HEMOGLOBIN g/dL 15.3   PLATELETS 10*3/mm3 183       Results from last 7 days  Lab Units 08/05/17  2211   SODIUM mmol/L 135   POTASSIUM mmol/L 4.1   CO2 mmol/L 29.0   CREATININE mg/dL 1.00   GLUCOSE mg/dL 128*   CALCIUM mg/dL 8.8       I have personally reviewed and interpreted available lab data, radiology studies and ECG obtained at time of admission.     Assessment / Plan     Problem List:   Hospital Problem List     * (Principal)Sepsis due to Diverticulitis    Abdominal aortic aneurysm    GERD (gastroesophageal reflux disease)    Hyperlipidemia    Type 2 diabetes mellitus    Hypertension    DDD (degenerative disc disease), lumbar    Overview Signed 5/19/2016 12:19 PM by Alicia Ramos MA     W/ lateral luxation          DDD (degenerative disc disease), cervical    Hypothyroidism    Tobacco abuse    On supplemental oxygen therapy, nightly    Diverticulitis of large intestine without perforation or abscess without bleeding          Plan:  1. Sepsis due to Diverticulitis:  - Febrile with leukocytosis. Lactic acid favorable.  - demonstrated on CT with no perforation or abscess.  - history of diverticulitis requiring hospitalization circa 2012/2014 at [facility] (order placed to obtain records). Patient states he was \"really sick\" requiring a consult to general surgery. Apparently they were prepared for a bowel resection but deemed it unnecessary and instead \"cleaned out the bowels\" per patient and son at bedside.  - does not " remember last colonoscopy but dates it before the surgery   - Zosyn 3.375  - saline 100 cc/hr  - npo diet with as needed analgesia  - blood cultures, am labs    2. Abdominal Aortic aneurysm:  - 4.9 dm diameter  -  No findings to suggest aortic rupture per CT  - will need vascular surgery as outpatient    3. DM2:  Diet controlled and stable. Monitor.    4. Hypertension:  - stable. Continue home meds for now. Monitor.    5. TOYIN:  - probable. Patient states he is on supplemental oxygen nightly because his oxygen saturation drops into the 80's but is adamant about not having sleep apnea    6. Tobacco abuse:  - nicotine patch as needed. Smokes 1PPD. Discussed with patient the benefits of tobacco abuse cessation for at least 3 minutes.     7. Weakness:  - TSH favorable 1.7.  - history of DDD. MRI negative for acute changes,  - PT/OT treat and eval    Patient stable for admission to TELEMETRY. Labs and vitals routine per nursing.    DVT prophylaxis: SCD/ИВАН  Code Status: full  Admission Status: Patient will be admitted to INPATIENT status due to the need for care which can only be reasonably provided in an hospital setting such as aggressive/expedited ancillary services and/or consultation services and the necessity for IV medications, close physician monitoring.  In such, I feel patient’s risk for adverse outcomes and need for care warrant INPATIENT evaluation and predict the patient’s care encounter to likely last beyond 2 midnights.       Hamida Parks PA-C 08/06/17 1:05 AM        Brief Attending Note       I have seen and examined the patient, performing an independent face-to-face diagnostic evaluation with plan of care reviewed and developed with the advanced practice clinician (APC).      Brief Summary Statement/HPI:   See full history above. Patient with known prior history of diverticulitis presents to ED with ~2 days general fatigue and weakness.  Upon further eval, CT abd pelv revealed diverticulitis and  "patient does endorse some mild LLQ cramping once further interviewed but denies diarrhea, melena, hematochezia, fever.  He had general weakness yesterday and felt \"just too tired\", was weak when getting up/down from chair and family was concerned so brought to ED. Pt's son at bedside states that a few years ago patient had similar episode and per the story likely had microperf at that time, partial colectomy was suggested but ultimately not pursued.       Attending Physical Exam:  Temp:  [99.3 °F (37.4 °C)-101.9 °F (38.8 °C)] 99.3 °F (37.4 °C)  Heart Rate:  [63-80] 63  Resp:  [20] 20  BP: (113-146)/() 123/67  Constitutional: no acute distress, awake, alert  Eyes: PERRLA, sclerae anicteric, no conjunctival injection  Neck: supple, no thyromegaly, trachea midline  Respiratory: Clear to auscultation bilaterally, nonlabored respirations   Cardiovascular: RRR, no murmur  Gastrointestinal: Positive bowel sounds, soft, mild LLQ TTP, no rebound, no distention  Musculoskeletal: No bilateral ankle edema, no clubbing or cyanosis to bilateral lower extremities  Psychiatric: oriented x 3, appropriate affect, cooperative  Neurologic: Movements symmetric in all extremities, Cranial Nerves grossly intact to confrontation         Brief Assessment/Plan :      See above for further detailed assessment and plan developed with APC which I have reviewed and/or edited.        Sigrid Weldon MD  08/06/17  6:02 AM         "

## 2017-08-06 NOTE — ED PROVIDER NOTES
Subjective   Patient is a 76 y.o. male presenting with weakness.   History provided by:  Patient, spouse and relative  Weakness - Generalized   Severity:  Severe  Onset quality:  Gradual  Duration:  5 hours  Timing:  Constant  Progression:  Worsening  Chronicity:  Chronic  Context comment:  Patient has history of bilateral leg weakness, but family now complains of overall generalized weakness and inability to stand  Relieved by:  Nothing  Worsened by:  Nothing  Ineffective treatments:  None tried  Associated symptoms: no abdominal pain, no chest pain, no cough, no diarrhea, no dizziness, no dysuria, no numbness in extremities, no fever, no foul-smelling urine, no frequency, no headaches, no melena, no nausea, no near-syncope, no syncope and no vomiting        Review of Systems   Constitutional: Positive for fatigue. Negative for chills and fever.   Respiratory: Negative for cough.    Cardiovascular: Negative for chest pain, syncope and near-syncope.   Gastrointestinal: Negative for abdominal pain, blood in stool, diarrhea, melena, nausea and vomiting.   Genitourinary: Negative for dysuria, flank pain and frequency.   Neurological: Positive for weakness. Negative for dizziness, light-headedness, numbness and headaches.   All other systems reviewed and are negative.      Past Medical History:   Diagnosis Date   • Diabetes mellitus    • Disease of thyroid gland    • Diverticulitis    • GERD (gastroesophageal reflux disease)    • Hypertension    • Stroke     TIA's- 2012       Allergies   Allergen Reactions   • Codeine        Past Surgical History:   Procedure Laterality Date   • BACK SURGERY N/A     L5-S1 fusion    • CARDIAC CATHETERIZATION         Family History   Problem Relation Age of Onset   • Stroke Mother    • Diabetes Mother        Social History     Social History   • Marital status:      Spouse name: N/A   • Number of children: N/A   • Years of education: N/A     Social History Main Topics   • Smoking  status: Current Every Day Smoker     Packs/day: 1.00     Types: Cigarettes   • Smokeless tobacco: None   • Alcohol use No   • Drug use: No   • Sexual activity: Defer     Other Topics Concern   • None     Social History Narrative   • None           Objective   Physical Exam   Constitutional: He is oriented to person, place, and time. He appears well-developed and well-nourished.   HENT:   Right Ear: External ear normal.   Left Ear: External ear normal.   Mouth/Throat: Uvula is midline, oropharynx is clear and moist and mucous membranes are normal.   Cardiovascular: Normal rate, regular rhythm and intact distal pulses.    Pulmonary/Chest: Effort normal and breath sounds normal. No respiratory distress. He has no wheezes.   Abdominal: Soft. Bowel sounds are normal. There is tenderness in the right upper quadrant, left upper quadrant and left lower quadrant. There is no rebound and no guarding.   Musculoskeletal:        Lumbar back: Normal. He exhibits no tenderness and no swelling.   No leg drift. Equal . No pronator drift.    Neurological: He is alert and oriented to person, place, and time. He has normal strength and normal reflexes. No sensory deficit. Coordination normal.   Speech clear, no facial droop   Skin: Skin is warm and dry.   Psychiatric: He has a normal mood and affect. His behavior is normal.   Vitals reviewed.      Procedures         ED Course  ED Course      Recent Results (from the past 24 hour(s))   BNP    Collection Time: 08/05/17  8:54 PM   Result Value Ref Range    BNP 19.0 0.0 - 100.0 pg/mL   CBC Auto Differential    Collection Time: 08/05/17  8:54 PM   Result Value Ref Range    WBC 15.78 (H) 3.50 - 10.80 10*3/mm3    RBC 4.67 4.20 - 5.76 10*6/mm3    Hemoglobin 15.3 13.1 - 17.5 g/dL    Hematocrit 44.1 38.9 - 50.9 %    MCV 94.4 80.0 - 99.0 fL    MCH 32.8 (H) 27.0 - 31.0 pg    MCHC 34.7 32.0 - 36.0 g/dL    RDW 13.7 11.3 - 14.5 %    RDW-SD 47.1 37.0 - 54.0 fl    MPV 10.4 6.0 - 12.0 fL     Platelets 183 150 - 450 10*3/mm3    Neutrophil % 62.9 41.0 - 71.0 %    Lymphocyte % 22.3 (L) 24.0 - 44.0 %    Monocyte % 13.5 (H) 0.0 - 12.0 %    Eosinophil % 0.8 0.0 - 3.0 %    Basophil % 0.2 0.0 - 1.0 %    Immature Grans % 0.3 0.0 - 0.6 %    Neutrophils, Absolute 9.93 (H) 1.50 - 8.30 10*3/mm3    Lymphocytes, Absolute 3.52 0.60 - 4.80 10*3/mm3    Monocytes, Absolute 2.13 (H) 0.00 - 1.00 10*3/mm3    Eosinophils, Absolute 0.12 0.00 - 0.30 10*3/mm3    Basophils, Absolute 0.03 0.00 - 0.20 10*3/mm3    Immature Grans, Absolute 0.05 (H) 0.00 - 0.03 10*3/mm3   Lactic Acid, Plasma    Collection Time: 08/05/17  8:54 PM   Result Value Ref Range    Lactate 1.2 0.5 - 2.0 mmol/L   POC Troponin, Rapid    Collection Time: 08/05/17  9:00 PM   Result Value Ref Range    Troponin I 0.02 0.00 - 0.07 ng/mL   Urinalysis With / Culture If Indicated    Collection Time: 08/05/17  9:08 PM   Result Value Ref Range    Color, UA Yellow Yellow, Straw    Appearance, UA Clear Clear    pH, UA 7.5 5.0 - 8.0    Specific Gravity, UA 1.020 1.001 - 1.030    Glucose, UA Negative Negative    Ketones, UA Trace (A) Negative    Bilirubin, UA Negative Negative    Blood, UA Small (1+) (A) Negative    Protein, UA 30 mg/dL (1+) (A) Negative    Leuk Esterase, UA Negative Negative    Nitrite, UA Negative Negative    Urobilinogen, UA 1.0 E.U./dL 0.2 - 1.0 E.U./dL   Comprehensive Metabolic Panel    Collection Time: 08/05/17 10:11 PM   Result Value Ref Range    Glucose 128 (H) 70 - 100 mg/dL    BUN 17 9 - 23 mg/dL    Creatinine 1.00 0.60 - 1.30 mg/dL    Sodium 135 132 - 146 mmol/L    Potassium 4.1 3.5 - 5.5 mmol/L    Chloride 101 99 - 109 mmol/L    CO2 29.0 20.0 - 31.0 mmol/L    Calcium 8.8 8.7 - 10.4 mg/dL    Total Protein 6.9 5.7 - 8.2 g/dL    Albumin 3.80 3.20 - 4.80 g/dL    ALT (SGPT) 9 7 - 40 U/L    AST (SGOT) 15 0 - 33 U/L    Alkaline Phosphatase 38 25 - 100 U/L    Total Bilirubin 0.6 0.3 - 1.2 mg/dL    eGFR Non African Amer 72 >60 mL/min/1.73    Globulin 3.1  gm/dL    A/G Ratio 1.2 (L) 1.5 - 2.5 g/dL    BUN/Creatinine Ratio 17.0 7.0 - 25.0    Anion Gap 5.0 3.0 - 11.0 mmol/L   TSH    Collection Time: 08/05/17 10:11 PM   Result Value Ref Range    TSH 1.720 0.350 - 5.350 mIU/mL   Valproic Acid Level, Total    Collection Time: 08/05/17 10:11 PM   Result Value Ref Range    Valproic Acid 50.0 50.0 - 150.0 mcg/mL     Note: In addition to lab results from this visit, the labs listed above may include labs taken at another facility or during a different encounter within the last 24 hours. Please correlate lab times with ED admission and discharge times for further clarification of the services performed during this visit.    CT Abdomen Pelvis Without Contrast   Final Result   Abnormal   1.  Findings compatible with diverticulitis at the level of the descending    colon. No evidence of associated perforation or abscess.    2.  Abdominal aortic aneurysm measuring 4.9 cm in diameter.  No findings to    suggest aortic rupture.      THIS DOCUMENT HAS BEEN ELECTRONICALLY SIGNED BY APOLINAR JENNINGS MD      MRI Lumbar Spine Without Contrast   Final Result   Abnormal   Degenerative changes of the lumbar spine, most prominent at L4-5.  No    significant interval change appreciated.      THIS DOCUMENT HAS BEEN ELECTRONICALLY SIGNED BY APOLINAR JENNINGS MD      XR Chest 1 View   Final Result   Abnormal     No acute findings visualized in the chest.       THIS DOCUMENT HAS BEEN ELECTRONICALLY SIGNED BY APOLINAR JENNINGS MD        Vitals:    08/05/17 2201 08/05/17 2238 08/05/17 2240 08/05/17 2245   BP:  133/72     Pulse:   80 77   Resp:       Temp: (!) 101.9 °F (38.8 °C)      TempSrc: Oral      SpO2:   94% 94%   Weight:       Height:         Medications   sodium chloride 0.9 % flush 10 mL (not administered)   acetaminophen (TYLENOL) tablet 650 mg (650 mg Oral Given 8/5/17 2253)   sodium chloride 0.9 % infusion (not administered)   metroNIDAZOLE (FLAGYL) IVPB 500 mg (not administered)   levoFLOXacin (LEVAQUIN) 750  mg/150 mL D5W (premix) (LEVAQUIN) 750 mg (not administered)   LORazepam (ATIVAN) injection 1 mg (1 mg Intravenous Given 8/5/17 2116)     ECG/EMG Results (last 24 hours)     Procedure Component Value Units Date/Time    ECG 12 Lead [[id number]] Collected:  08/05/17 1949     Updated:  08/05/17 1950        Spoke with patient and family regarding CT scan, MRI and blood work.  Recommend inpatient hospitalization admission.  All agreeable.    Spoke with Dr. Weldon with Hospital medicine who will admit patient to telemetry              MDM    Final diagnoses:   Diverticulitis of large intestine without perforation or abscess without bleeding   Leukocytosis, unspecified type   Fever in adult   Generalized weakness            WASHINGTON Luevano  08/05/17 7423       WASHINGTON Luevano  08/05/17 9910

## 2017-08-06 NOTE — PLAN OF CARE
Problem: Patient Care Overview (Adult)  Goal: Plan of Care Review  Outcome: Ongoing (interventions implemented as appropriate)    08/06/17 1500   Coping/Psychosocial Response Interventions   Plan Of Care Reviewed With patient;family   Patient Care Overview   Progress improving   Outcome Evaluation   Outcome Summary/Follow up Plan pt reports diarrhea slowed down as the day progressed, was able to ambulate with less fatigue         Problem: Fall Risk (Adult)  Goal: Identify Related Risk Factors and Signs and Symptoms  Outcome: Ongoing (interventions implemented as appropriate)  Goal: Absence of Falls  Outcome: Ongoing (interventions implemented as appropriate)    Problem: Sepsis (Adult)  Goal: Signs and Symptoms of Listed Potential Problems Will be Absent or Manageable (Sepsis)  Outcome: Ongoing (interventions implemented as appropriate)

## 2017-08-07 LAB
ANION GAP SERPL CALCULATED.3IONS-SCNC: 9 MMOL/L (ref 3–11)
BUN BLD-MCNC: 9 MG/DL (ref 9–23)
BUN/CREAT SERPL: 8.2 (ref 7–25)
C DIFF TOX GENS STL QL NAA+PROBE: NOT DETECTED
CALCIUM SPEC-SCNC: 8.7 MG/DL (ref 8.7–10.4)
CHLORIDE SERPL-SCNC: 110 MMOL/L (ref 99–109)
CO2 SERPL-SCNC: 24 MMOL/L (ref 20–31)
CREAT BLD-MCNC: 1.1 MG/DL (ref 0.6–1.3)
DEPRECATED RDW RBC AUTO: 47.2 FL (ref 37–54)
ERYTHROCYTE [DISTWIDTH] IN BLOOD BY AUTOMATED COUNT: 13.6 % (ref 11.3–14.5)
GFR SERPL CREATININE-BSD FRML MDRD: 65 ML/MIN/1.73
GLUCOSE BLD-MCNC: 91 MG/DL (ref 70–100)
HCT VFR BLD AUTO: 41.1 % (ref 38.9–50.9)
HGB BLD-MCNC: 13.9 G/DL (ref 13.1–17.5)
MCH RBC QN AUTO: 31.8 PG (ref 27–31)
MCHC RBC AUTO-ENTMCNC: 33.8 G/DL (ref 32–36)
MCV RBC AUTO: 94.1 FL (ref 80–99)
PLATELET # BLD AUTO: 170 10*3/MM3 (ref 150–450)
PMV BLD AUTO: 10.1 FL (ref 6–12)
POTASSIUM BLD-SCNC: 3.6 MMOL/L (ref 3.5–5.5)
RBC # BLD AUTO: 4.37 10*6/MM3 (ref 4.2–5.76)
SODIUM BLD-SCNC: 143 MMOL/L (ref 132–146)
WBC NRBC COR # BLD: 8.79 10*3/MM3 (ref 3.5–10.8)

## 2017-08-07 PROCEDURE — 99233 SBSQ HOSP IP/OBS HIGH 50: CPT | Performed by: INTERNAL MEDICINE

## 2017-08-07 PROCEDURE — 85027 COMPLETE CBC AUTOMATED: CPT | Performed by: INTERNAL MEDICINE

## 2017-08-07 PROCEDURE — 87493 C DIFF AMPLIFIED PROBE: CPT | Performed by: INTERNAL MEDICINE

## 2017-08-07 PROCEDURE — 25010000002 PIPERACILLIN SOD-TAZOBACTAM PER 1 G: Performed by: PHYSICIAN ASSISTANT

## 2017-08-07 PROCEDURE — 80048 BASIC METABOLIC PNL TOTAL CA: CPT | Performed by: INTERNAL MEDICINE

## 2017-08-07 PROCEDURE — 97166 OT EVAL MOD COMPLEX 45 MIN: CPT

## 2017-08-07 PROCEDURE — 97162 PT EVAL MOD COMPLEX 30 MIN: CPT

## 2017-08-07 RX ORDER — LEVOFLOXACIN 750 MG/1
750 TABLET ORAL
Status: DISCONTINUED | OUTPATIENT
Start: 2017-08-07 | End: 2017-08-08 | Stop reason: HOSPADM

## 2017-08-07 RX ORDER — METRONIDAZOLE 500 MG/1
500 TABLET ORAL EVERY 6 HOURS SCHEDULED
Status: DISCONTINUED | OUTPATIENT
Start: 2017-08-07 | End: 2017-08-08 | Stop reason: HOSPADM

## 2017-08-07 RX ADMIN — CARVEDILOL 12.5 MG: 12.5 TABLET, FILM COATED ORAL at 08:21

## 2017-08-07 RX ADMIN — PANTOPRAZOLE SODIUM 40 MG: 40 TABLET, DELAYED RELEASE ORAL at 08:22

## 2017-08-07 RX ADMIN — METRONIDAZOLE 500 MG: 500 INJECTION, SOLUTION INTRAVENOUS at 06:30

## 2017-08-07 RX ADMIN — RAMIPRIL 10 MG: 2.5 CAPSULE ORAL at 08:19

## 2017-08-07 RX ADMIN — TAZOBACTAM SODIUM AND PIPERACILLIN SODIUM 3.38 G: 375; 3 INJECTION, SOLUTION INTRAVENOUS at 00:59

## 2017-08-07 RX ADMIN — LEVOFLOXACIN 750 MG: 750 TABLET, FILM COATED ORAL at 11:10

## 2017-08-07 RX ADMIN — METRONIDAZOLE 500 MG: 500 TABLET ORAL at 17:46

## 2017-08-07 RX ADMIN — DIVALPROEX SODIUM 500 MG: 500 TABLET, DELAYED RELEASE ORAL at 08:23

## 2017-08-07 RX ADMIN — METRONIDAZOLE 500 MG: 500 TABLET ORAL at 11:10

## 2017-08-07 RX ADMIN — ROSUVASTATIN CALCIUM 40 MG: 20 TABLET, FILM COATED ORAL at 20:36

## 2017-08-07 RX ADMIN — QUETIAPINE FUMARATE 300 MG: 200 TABLET, EXTENDED RELEASE ORAL at 20:37

## 2017-08-07 RX ADMIN — CITALOPRAM HYDROBROMIDE 40 MG: 20 TABLET ORAL at 08:20

## 2017-08-07 RX ADMIN — CLOPIDOGREL BISULFATE 75 MG: 75 TABLET ORAL at 08:22

## 2017-08-07 RX ADMIN — METRONIDAZOLE 500 MG: 500 TABLET ORAL at 23:27

## 2017-08-07 RX ADMIN — LEVOTHYROXINE SODIUM 50 MCG: 50 TABLET ORAL at 06:29

## 2017-08-07 RX ADMIN — DIVALPROEX SODIUM 500 MG: 500 TABLET, DELAYED RELEASE ORAL at 20:37

## 2017-08-07 RX ADMIN — TAZOBACTAM SODIUM AND PIPERACILLIN SODIUM 3.38 G: 375; 3 INJECTION, SOLUTION INTRAVENOUS at 05:31

## 2017-08-07 NOTE — PLAN OF CARE
Problem: Patient Care Overview (Adult)  Goal: Plan of Care Review  Outcome: Ongoing (interventions implemented as appropriate)    08/07/17 1358   Coping/Psychosocial Response Interventions   Plan Of Care Reviewed With patient   Patient Care Overview   Progress improving   Outcome Evaluation   Outcome Summary/Follow up Plan OT cindy complete. Pt. demonstrating impaired stength, balance and activity tolerance impacting PLOF with ADL and transfers. May need HH on discharge.         Problem: Inpatient Occupational Therapy  Goal: Bed Mobility Goal LTG- OT  Outcome: Ongoing (interventions implemented as appropriate)    08/07/17 1358   Bed Mobility OT LTG   Bed Mobility OT LTG, Date Established 08/07/17   Bed Mobility OT LTG, Time to Achieve 1 wk   Bed Mobility OT LTG, Activity Type all bed mobility   Bed Mobility OT LTG, [place] Level supervision required   Bed Mobility OT LTG, Assist Device bed rails   Bed Mobility OT LTG, Outcome goal ongoing       Goal: Transfer Training Goal 1 LTG- OT  Outcome: Ongoing (interventions implemented as appropriate)    08/07/17 1358   Transfer Training OT LTG   Transfer Training OT LTG, Date Established 08/07/17   Transfer Training OT LTG, Time to Achieve 1 wk   Transfer Training OT LTG, Activity Type sit to stand/stand to sit;toilet;bed to chair /chair to bed   Transfer Training OT LTG, [place] Level supervision required   Transfer Training OT LTG, Assist Device walker, rolling;commode, bedside   Transfer Training OT LTG, Outcome goal ongoing       Goal: Strength Goal LTG- OT  Outcome: Ongoing (interventions implemented as appropriate)    08/07/17 1358   Strength OT LTG   Strength Goal OT LTG, Date Established 08/07/17   Strength Goal OT LTG, Time to Achieve 1 wk   Strength Goal OT LTG, Measure to Achieve BUE 15-20 reps each session to build strength and activity tolerance to support ADL and mobility indep   Strength Goal OT LTG, Outcome goal ongoing       Goal: Patient Education  Goal LTG- OT  Outcome: Ongoing (interventions implemented as appropriate)    08/07/17 1358   Patient Education OT LTG   Patient Education OT LTG, Date Established 08/07/17   Patient Education OT LTG, Time to Achieve 1 wk   Patient Education OT LTG, Education Type written program;HEP;posture/body mechanics;home safety   Patient Education OT LTG, Education Understanding verbalizes understanding   Patient Education OT LTG Outcome goal ongoing       Goal: Functional Mobility Goal LTG- OT  Outcome: Ongoing (interventions implemented as appropriate)    08/07/17 1358   Functional Mobility OT LTG   Functional Mobility Goal OT LTG, Date Established 08/07/17   Functional Mobility Goal OT LTG, Time to Achieve 1 wk   Functional Mobility Goal OT LTG, [place] Level supervision   Functional Mobility Goal OT LTG, Assist Device rolling walker   Functional Mobility Goal OT LTG, Distance to Achieve to the bathroom  (distance in pt. home from all areas)   Functional Mobility Goal OT LTG, Outcome goal ongoing

## 2017-08-07 NOTE — PLAN OF CARE
Problem: Patient Care Overview (Adult)  Goal: Plan of Care Review  Outcome: Ongoing (interventions implemented as appropriate)    08/07/17 1346   Coping/Psychosocial Response Interventions   Plan Of Care Reviewed With patient   Outcome Evaluation   Outcome Summary/Follow up Plan Pt ambulates 40 ft in room using RWx and CGA, MIN A x2 for bed mobility, t/f. May need HHPT upon d/c.         Problem: Inpatient Physical Therapy  Goal: Bed Mobility Goal LTG- PT  Outcome: Ongoing (interventions implemented as appropriate)    08/07/17 1346   Bed Mobility PT LTG   Bed Mobility PT LTG, Date Established 08/07/17   Bed Mobility PT LTG, Time to Achieve 2 wks   Bed Mobility PT LTG, Activity Type supine to sit/sit to supine   Bed Mobility PT LTG, [place] Level independent       Goal: Transfer Training Goal 1 LTG- PT  Outcome: Ongoing (interventions implemented as appropriate)    08/07/17 1346   Transfer Training PT LTG   Transfer Training PT LTG, Date Established 08/07/17   Transfer Training PT LTG, Time to Achieve 2 wks   Transfer Training PT LTG, Activity Type sit to stand/stand to sit   Transfer Training PT LTG, [place] Level conditional independence   Transfer Training PT LTG, Assist Device walker, rolling       Goal: Gait Training Goal LTG- PT  Outcome: Ongoing (interventions implemented as appropriate)    08/07/17 1346   Gait Training PT LTG   Gait Training Goal PT LTG, Date Established 08/07/17   Gait Training Goal PT LTG, Time to Achieve 2 wks   Gait Training Goal PT LTG, [place] Level conditional independence   Gait Training Goal PT LTG, Assist Device walker, rolling   Gait Training Goal PT LTG, Distance to Achieve 350

## 2017-08-07 NOTE — PROGRESS NOTES
"      HOSPITALIST DAILY PROGRESS NOTE    Chief Complaint: weakness    Subjective   SUBJECTIVE/OVERNIGHT EVENTS     Diarrhea has slowed somewhat but still having frequent, loose BMs.  Feeling better overall and no fevers overnight.   Review of Systems:  Gen-no fevers, no chills  CV-no chest pain, no palpitations  Resp-no cough, no dyspnea  GI-no N/V/D, no abd pain    Objective   OBJECTIVE   I have reviewed the vital signs.  /87  Pulse 58  Temp 98.2 °F (36.8 °C) (Oral)   Resp 20  Ht 73\" (185.4 cm)  Wt 196 lb (88.9 kg)  SpO2 94%  BMI 25.86 kg/m2    Physical Exam:  Gen-no acute distress  CV-RRR, S1 S2 normal, no m/r/g  Resp-CTAB, no wheezes  Abd-soft, NT, ND, +BS  Ext-no edema  Neuro-A&Ox3, no focal deficits  Psych-appropriate mood    Results:  I have reviewed the labs, culture data, radiology results, and diagnostic studies.      Results from last 7 days  Lab Units 08/07/17  0659 08/06/17  0855 08/05/17  2054   WBC 10*3/mm3 8.79 12.57* 15.78*   HEMOGLOBIN g/dL 13.9 14.1 15.3   HEMATOCRIT % 41.1 41.4 44.1   PLATELETS 10*3/mm3 170 162 183       Results from last 7 days  Lab Units 08/07/17  0659   SODIUM mmol/L 143   POTASSIUM mmol/L 3.6   CHLORIDE mmol/L 110*   CO2 mmol/L 24.0   BUN mg/dL 9   CREATININE mg/dL 1.10   GLUCOSE mg/dL 91   CALCIUM mg/dL 8.7       Culture Data:  Cultures:           Radiology Results:  Imaging Results (last 24 hours)     ** No results found for the last 24 hours. **          I have reviewed the medications.      Assessment/Plan   ASSESSMENT/PLAN    Principal Problem:    Sepsis due to Diverticulitis  Active Problems:    Leukocytosis    DDD (degenerative disc disease), lumbar    DDD (degenerative disc disease), cervical    GERD (gastroesophageal reflux disease)    Hypothyroidism    Hyperlipidemia    Type 2 diabetes mellitus    Tobacco abuse    Abdominal aortic aneurysm    On supplemental oxygen therapy, nightly    Hypertension    Mr. Vargas is a 75 yo WM w/ PMH of diverticulitis " four years ago, HTN, HLD, AAA and hypothyroidism who presents with sepsis (fever and leukocytosis) due to diverticulitis.    Plan:  --stop IVFs and IV ABX, transition to PO ABX.  Await culture results  -- will need outpatient follow up of AAA (follows with Dr. Campoverde)  --pt refuses nicotine patch      I expect patient to be discharged in: 1 day to home    Suze Vergara MD  08/07/17  12:18 PM

## 2017-08-07 NOTE — PLAN OF CARE
Problem: Patient Care Overview (Adult)  Goal: Discharge Needs Assessment  Outcome: Ongoing (interventions implemented as appropriate)    Problem: Fall Risk (Adult)  Goal: Absence of Falls  Outcome: Ongoing (interventions implemented as appropriate)    Problem: Sepsis (Adult)  Goal: Signs and Symptoms of Listed Potential Problems Will be Absent or Manageable (Sepsis)  Outcome: Ongoing (interventions implemented as appropriate)

## 2017-08-07 NOTE — THERAPY EVALUATION
Acute Care - Occupational Therapy Initial Evaluation  [facility]     Patient Name: Valente Vargas Jr.  : 1940  MRN: [id number]  Today's Date: 2017  Onset of Illness/Injury or Date of Surgery Date: 17  Date of Referral to OT: 17  Referring Physician: CRYSTAL Parks    Admit Date: 2017       ICD-10-CM ICD-9-CM   1. Diverticulitis of large intestine without perforation or abscess without bleeding K57.32 562.11   2. Leukocytosis, unspecified type D72.829 288.60   3. Fever in adult R50.9 780.60   4. Generalized weakness R53.1 780.79   5. Impaired functional mobility, balance, gait, and endurance Z74.09 V49.89   6. Impaired mobility and ADLs Z74.09 799.89     Patient Active Problem List   Diagnosis   • Spinal stenosis at L4-L5 level   • DDD (degenerative disc disease), lumbar   • Gait disturbance   • DDD (degenerative disc disease), cervical   • GERD (gastroesophageal reflux disease)   • Hypothyroidism   • Hyperlipidemia   • Type 2 diabetes mellitus   • Tobacco abuse   • Sepsis due to Diverticulitis   • Abdominal aortic aneurysm   • On supplemental oxygen therapy, nightly   • Hypertension   • Leukocytosis     Past Medical History:   Diagnosis Date   • Diabetes mellitus    • Disease of thyroid gland    • Diverticulitis    • GERD (gastroesophageal reflux disease)    • Hypertension    • Stroke     TIA's-      Past Surgical History:   Procedure Laterality Date   • BACK SURGERY N/A     L5-S1 fusion    • CARDIAC CATHETERIZATION            OT ASSESSMENT FLOWSHEET (last 72 hours)      OT Evaluation       17 1303 17 1301 17 0500          Rehab Evaluation    Document Type evaluation  -KINSEY evaluation  -EH       Subjective Information no complaints;agree to therapy  -KINSEY no complaints;agree to therapy  -EH       Patient Effort, Rehab Treatment good  -KINSEY        Symptoms Noted During/After Treatment fatigue  -KINSEY none  -EH       General Information    Patient Profile Review yes  -KINSEY yes   "-       Onset of Illness/Injury or Date of Surgery Date 08/05/17  -KINSEY 08/06/17  -       Referring Physician DARLEEN Parks-C  -KINSEY DARLEEN Parks-  -       General Observations Pt. supine in bed with wife present. RA  -KINSEY Pt supine in bed, IV intact.Wife in room  -       Pertinent History Of Current Problem Pt. developed generalized weakness and fatigue requiring two sons to assist him up and to hospital.  2-3 year history of LE weakness, but worsened.  Pt. found with sepsis due to diverticulosis.  Also found with 4.9 AAA to tx OP.  Since to hospital with diarrhea and being chcked for C-diff.  -KINSEY Pt with sepsis, diverticulitis, AAA, denies TOYIN with decreased saturation at night, weakness, fatigue. \"legs give out\". Hx of L 5-S1 fusion, TIA.  -       Precautions/Limitations fall precautions  - fall precautions;oxygen therapy device and L/min  -       Prior Level of Function independent:;all household mobility;ADL's;transfer;driving;max assist:;shopping;dependent:;home management   scooter in grocery, slide feet, wx to porch, bad days help  - independent:;ADL's;all household mobility   uses walker when going to porch, slides feet across ground  -       Equipment Currently Used at Home oxygen;grab bar;walker, rolling;shower chair   high toilet one bathroom.  - oxygen;grab bar;walker, rolling  - oxygen  -      Plans/Goals Discussed With patient;agreed upon  -KINSEY patient;agreed upon  -       Risks Reviewed patient:;LOB;increased discomfort;change in vital signs  - patient:;LOB;increased discomfort;change in vital signs;dizziness  -       Benefits Reviewed patient:;spouse/S.O.:;improve function;increase independence;increase strength;increase balance;increase knowledge  - patient:;spouse/S.O.:;improve function;increase independence  -       Barriers to Rehab medically complex;previous functional deficit  -KINSEY medically complex;previous functional deficit  -       Living Environment    Lives With " spouse  -KINSEY spouse  - spouse  -      Living Arrangements house  -KINSEY house  - house  -      Home Accessibility no concerns  -KINSEY  no concerns  -      Stair Railings at Home   none  -      Type of Financial/Environmental Concern   none  -      Transportation Available   car;family or friend will provide  -      Clinical Impression    Date of Referral to OT 08/06/17  -        OT Diagnosis Impaired ADL and mobility indep due to diverticulosis with sepsis and possible C-diff.  -KINSEY        Functional Level Prior    Ambulation   0-->independent  -      Transferring   0-->independent  -GH      Toileting   0-->independent  -GH      Bathing   0-->independent  -GH      Dressing   0-->independent  -GH      Eating   0-->independent  -      Communication   0-->understands/communicates without difficulty  -      Swallowing   0-->swallows foods/liquids without difficulty  -      Vital Signs    Post Systolic BP Rehab --   monitored during session and stable.  - --   WNL  -       Pre SpO2 (%) 93  -KINSEY 93  -EH       O2 Delivery Pre Treatment room air  -KINSEY room air  -       Pain Assessment    Pain Assessment No/denies pain  - No/denies pain  -       Vision Assessment/Intervention    Visual Impairment WFL with corrective lenses  -        Cognitive Assessment/Intervention    Current Cognitive/Communication Assessment functional  - functional  -       Orientation Status oriented x 4  - oriented x 4  -       Follows Commands/Answers Questions 100% of the time;able to follow single-step instructions  - 100% of the time;able to follow single-step instructions  [facility]       Personal Safety WNL/WFL   seems to know limits.  -KINSEY WNL/WFL  -       Personal Safety Interventions fall prevention program maintained;gait belt;nonskid shoes/slippers when out of bed  - fall prevention program maintained;gait belt;nonskid shoes/slippers when out of bed  -       ROM (Range of Motion)    General ROM upper  extremity range of motion deficits identified  -KINSEY        General ROM Detail prior RTC sx and can only lift sh 75%  -KINSEY WFL BLEs  -EH       MMT (Manual Muscle Testing)    General MMT Assessment upper extremity strength deficits identified  -KINSEY        General MMT Assessment Detail distally 4+ to 5/5 R sh. 4+/5 L 4/5  -KINSEY hip flexion 4-/5, remaining 5/5 BLEs  -EH       Bed Mobility, Assessment/Treatment    Bed Mobility, Scoot/Bridge, [place] maximum assist (25% patient effort);2 person assist required;verbal cues required   to HOB, cues to push with feet  -KINSEY maximum assist (25% patient effort);2 person assist required  -       Bed Mob, Supine to Sit, [place] minimum assist (75% patient effort);2 person assist required;other (see comments)  -KINSEY minimum assist (75% patient effort);2 person assist required  -EH       Bed Mob, Sit to Supine, [place] contact guard assist;2 person assist required;verbal cues required  -KINSEY contact guard assist  -EH       Bed Mobility, Impairments strength decreased  -KINSEY        Transfer Assessment/Treatment    Transfers, Sit-Stand [place] minimum assist (75% patient effort);2 person assist required;contact guard assist  -KINSEY minimum assist (75% patient effort);2 person assist required  -EH       Transfers, Stand-Sit [place] minimum assist (75% patient effort);2 person assist required;verbal cues required  -KINSEY minimum assist (75% patient effort);2 person assist required  -EH       Transfer, Impairments strength decreased  -KINSEY        Transfer, Comment cues push up and reach back  -KINSEY        Functional Mobility    Functional Mobility- Ind. Level contact guard assist;2 person assist required  -KINSEY        Functional Mobility- Device rolling walker  -KINSEY        Functional Mobility-Distance (Feet) 40  -KINSEY        Functional Mobility- Safety Issues step length decreased;weight-shifting ability decreased  -KINSEY        Lower Body Dressing Assessment/Training    LB Dressing  Assess/Train, Clothing Type doffing:;donning:;slipper socks  -        LB Dressing Assess/Train, Position sitting  -        LB Dressing Assess/Train, [place] independent  -        Motor Skills/Interventions    Additional Documentation Balance Skills Training (Group)  -        Balance Skills Training    Sitting-Level of Assistance Close supervision;Distant supervision  -        Standing-Level of Assistance Contact guard  -        Static Standing Balance Support assistive device  -        Gait Balance-Level of Assistance Contact guard  -        Gait Balance Support assistive device  -        Therapy Exercises    Bilateral Lower Extremities  AROM:;10 reps;hip flexion;LAQ  -EH       Sensory Assessment/Intervention    Light Touch LUE;RUE  -KINSEY LLE;RLE  -       LUE Light Touch WNL  -KINSEY        RUE Light Touch WNL  -KINSEY        LLE Light Touch  WNL  -       RLE Light Touch  WNL  -       Positioning and Restraints    Pre-Treatment Position in bed  -KINSEY in bed  -       Post Treatment Position bed  - bed  -       In Bed supine;notified nsg;call light within reach;with family/caregiver;encouraged to call for assist;exit alarm on  - notified nsg;supine;call light within reach;encouraged to call for assist;exit alarm on;with family/caregiver  -         User Key  (r) = Recorded By, (t) = Taken By, (c) = Cosigned By    Initials Name Effective Dates    KINSEY Tran Nash, OT 06/22/15 -      Chloe Draper, PT 06/19/15 -      Julia Vergara RN 07/10/17 -            Occupational Therapy Education     Title: PT OT SLP Therapies (Active)     Topic: Occupational Therapy (Active)     Point: ADL training (Done)    Description: Instruct learner(s) on proper safety adaptation and remediation techniques during self care or transfers.   Instruct in proper use of assistive devices.    Learning Progress Summary    Learner Readiness Method Response Comment Documented by Status   Patient Acceptance E VU,NR  role OT, reason for consult, transfer safety  08/07/17 1357 Done               Point: Precautions (Done)    Description: Instruct learner(s) on prescribed precautions during self-care and functional transfers.    Learning Progress Summary    Learner Readiness Method Response Comment Documented by Status   Patient Acceptance E VU,NR role OT, reason for consult, transfer safety  08/07/17 1357 Done                      User Key     Initials Effective Dates Name Provider Type Discipline     06/22/15 -  Tran Nash, OT Occupational Therapist OT                  OT Recommendation and Plan     Plan of Care Review  Plan Of Care Reviewed With: patient  Progress: improving  Outcome Summary/Follow up Plan: OT cindy complete.  Pt. demonstrating impaired stength, balance and activity tolerance impacting PLOF with ADL and transfers.  May need HH on discharge.          OT Goals       08/07/17 1358          Bed Mobility OT LTG    Bed Mobility OT LTG, Date Established 08/07/17  -KINSEY      Bed Mobility OT LTG, Time to Achieve 1 wk  -KINSEY      Bed Mobility OT LTG, Activity Type all bed mobility  -KINSEY      Bed Mobility OT LTG, Power Level supervision required  -KINSEY      Bed Mobility OT LTG, Assist Device bed rails  -KINSEY      Bed Mobility OT LTG, Outcome goal ongoing  -KINSEY      Transfer Training OT LTG    Transfer Training OT LTG, Date Established 08/07/17  -KINSEY      Transfer Training OT LTG, Time to Achieve 1 wk  -KINSEY      Transfer Training OT LTG, Activity Type sit to stand/stand to sit;toilet;bed to chair /chair to bed  -KINSEY      Transfer Training OT LTG, Power Level supervision required  -KINSEY      Transfer Training OT LTG, Assist Device walker, rolling;commode, bedside  -KINSEY      Transfer Training OT LTG, Outcome goal ongoing  -KINSEY      Strength OT LTG    Strength Goal OT LTG, Date Established 08/07/17  -KINSEY      Strength Goal OT LTG, Time to Achieve 1 wk  -KINSEY      Strength Goal OT LTG, Measure to Achieve BUE 15-20 reps each  session to build strength and activity tolerance to support ADL and mobility  indep  -KINSEY      Strength Goal OT LTG, Outcome goal ongoing  -KINSEY      Patient Education OT LTG    Patient Education OT LTG, Date Established 08/07/17  -KINSEY      Patient Education OT LTG, Time to Achieve 1 wk  -KINSEY      Patient Education OT LTG, Education Type written program;HEP;posture/body mechanics;home safety  -KINSEY      Patient Education OT LTG, Education Understanding verbalizes understanding  -KINSEY      Patient Education OT LTG Outcome goal ongoing  -KINSEY      Functional Mobility OT LTG    Functional Mobility Goal OT LTG, Date Established 08/07/17  -KINSEY      Functional Mobility Goal OT LTG, Time to Achieve 1 wk  -KINSEY      Functional Mobility Goal OT LTG, [place] Level supervision  -KINSEY      Functional Mobility Goal OT LTG, Assist Device rolling walker  -KINSEY      Functional Mobility Goal OT LTG, Distance to Achieve to the bathroom   distance in pt. home from all areas  -KINSEY      Functional Mobility Goal OT LTG, Outcome goal ongoing  -KINSEY        User Key  (r) = Recorded By, (t) = Taken By, (c) = Cosigned By    Initials Name Provider Type    KINSEY Tran Nsah OT Occupational Therapist                Outcome Measures       08/07/17 1303 08/07/17 1301       How much help from another person do you currently need...    Turning from your back to your side while in flat bed without using bedrails?  3  -EH     Moving from lying on back to sitting on the side of a flat bed without bedrails?  3  -EH     Moving to and from a bed to a chair (including a wheelchair)?  3  -EH     Standing up from a chair using your arms (e.g., wheelchair, bedside chair)?  3  -EH     Climbing 3-5 steps with a railing?  1  -EH     To walk in hospital room?  3  -EH     AM-PAC 6 Clicks Score  16  -EH     How much help from another is currently needed...    Putting on and taking off regular lower body clothing? 3  -KINSEY      Bathing (including washing, rinsing, and drying) 3  -KINSEY       Toileting (which includes using toilet bed pan or urinal) 3  -KINSEY      Putting on and taking off regular upper body clothing 3  -KINSEY      Taking care of personal grooming (such as brushing teeth) 3  -KINSEY      Eating meals 4  -KINSEY      Score 19  -KINSEY      Functional Assessment    Outcome Measure Options AM-PAC 6 Clicks Daily Activity (OT)  -KINSEY AM-PAC 6 Clicks Basic Mobility (PT)  -       User Key  (r) = Recorded By, (t) = Taken By, (c) = Cosigned By    Initials Name Provider Type    KINSEY Nash, OT Occupational Therapist     Chloe Draper, PT Physical Therapist          Time Calculation:   OT Start Time: 1303    Therapy Charges for Today     Code Description Service Date Service Provider Modifiers Qty    [id number]  OT EVAL MOD COMPLEXITY 4 8/7/2017 Tran Nash OT GO 1               Tran Nash OT  8/7/2017

## 2017-08-07 NOTE — DISCHARGE PLACEMENT REQUEST
"Bravo Vargas Jr. (76 y.o. Male)     Shanthi Florez, [phone]    Rollator walker, please deliver to room S338    Date of Birth Social Security Number Address Home Phone MRN    1940  [address] [place] [address] [place] [address] [phone] [id number]    Buddhist Marital Status          Judaism        Admission Date Admission Type Admitting Provider Attending Provider Department, Room/Bed    17 Emergency Suze Vergara MD [place], Suze Diaz MD [facility] 3E, S338/1    Discharge Date Discharge Disposition Discharge Destination                      Attending Provider: Suze Vergara MD     Allergies:  Codeine    Isolation:  Spore   Infection:  C.difficile (rule out) (17)   Code Status:  FULL    Ht:  73\" (185.4 cm)   Wt:  196 lb (88.9 kg)    Admission Cmt:  None   Principal Problem:  Sepsis due to Diverticulitis [A41.9]                 Active Insurance as of 2017     Primary Coverage     Payor Plan Insurance Group Employer/Plan Group    HUMANA MEDICARE REPLACEMENT HUMANA MEDICARE REPL [id number]     Payor Plan Address Payor Plan Phone Number Effective From Effective To    [address] [address] [phone] [address]     [place] [address]       Subscriber Name Subscriber Birth Date Member ID       BRAVO VARGAS JR. 1940 [id number]                 Emergency Contacts      (Rel.) Home Phone Work Phone Mobile Phone    Tiffany Vargas (Spouse) [phone] -- [phone]    Sam,Tim -- -- [phone]    SamSrinivas garland -- -- [phone]    Sam,Lisa -- -- [phone]          [facility] [address]  [address] [place] [address]  Phone:  [phone]  Fax:  [phone] Date Ordered: Aug 7, 2017         Patient:  Bravo Vargas Jr. MRN:  [id number]   [address] [place] [address] :  1940  SSN:    Phone: [phone] Sex:  M     Weight: 196 lb (88.9 kg)         Ht Readings from Last 1 " "Encounters:   08/06/17 73\" (185.4 cm)         Walker                         (Order ID: [id number])    Diagnosis:  Impaired functional mobility, balance, gait, and endurance (Z74.09 [ICD-10-CM] V49.89 [ICD-9-CM])  Gait disturbance (R26.9 [ICD-10-CM] 781.2 [ICD-9-CM])   Quantity:  1     Equipment:  Walker Folding with Wheels  Accessories:  Seat Attachment, Walker  Length of Need (99 Months = Lifetime): 99 Months = Lifetime (rollator walker)            Verbal Order Mode: Per protocol: cosign required  Authorizing Provider: Suze Vergara MD  Authorizing Provider's NPI: [id number]     Order Entered By: Shanthi Florez RN 8/7/2017  2:53 PM                   Physician Progress Notes (most recent note)      Suze Vergara MD at 8/7/2017  9:18 AM  Version 1 of 1               HOSPITALIST DAILY PROGRESS NOTE    Chief Complaint: weakness    Subjective   SUBJECTIVE/OVERNIGHT EVENTS     Diarrhea has slowed somewhat but still having frequent, loose BMs.  Feeling better overall and no fevers overnight.   Review of Systems:  Gen-no fevers, no chills  CV-no chest pain, no palpitations  Resp-no cough, no dyspnea  GI-no N/V/D, no abd pain    Objective   OBJECTIVE   I have reviewed the vital signs.  /87  Pulse 58  Temp 98.2 °F (36.8 °C) (Oral)   Resp 20  Ht 73\" (185.4 cm)  Wt 196 lb (88.9 kg)  SpO2 94%  BMI 25.86 kg/m2    Physical Exam:  Gen-no acute distress  CV-RRR, S1 S2 normal, no m/r/g  Resp-CTAB, no wheezes  Abd-soft, NT, ND, +BS  Ext-no edema  Neuro-A&Ox3, no focal deficits  Psych-appropriate mood    Results:  I have reviewed the labs, culture data, radiology results, and diagnostic studies.      Results from last 7 days  Lab Units 08/07/17  0659 08/06/17  0855 08/05/17  2054   WBC 10*3/mm3 8.79 12.57* 15.78*   HEMOGLOBIN g/dL 13.9 14.1 15.3   HEMATOCRIT % 41.1 41.4 44.1   PLATELETS 10*3/mm3 170 162 183       Results from last 7 days  Lab Units 08/07/17  0659   SODIUM mmol/L 143 "   POTASSIUM mmol/L 3.6   CHLORIDE mmol/L 110*   CO2 mmol/L 24.0   BUN mg/dL 9   CREATININE mg/dL 1.10   GLUCOSE mg/dL 91   CALCIUM mg/dL 8.7       Culture Data:  Cultures:           Radiology Results:  Imaging Results (last 24 hours)     ** No results found for the last 24 hours. **          I have reviewed the medications.      Assessment/Plan   ASSESSMENT/PLAN    Principal Problem:    Sepsis due to Diverticulitis  Active Problems:    Leukocytosis    DDD (degenerative disc disease), lumbar    DDD (degenerative disc disease), cervical    GERD (gastroesophageal reflux disease)    Hypothyroidism    Hyperlipidemia    Type 2 diabetes mellitus    Tobacco abuse    Abdominal aortic aneurysm    On supplemental oxygen therapy, nightly    Hypertension    Mr. Vargas is a 75 yo WM w/ PMH of diverticulitis four years ago, HTN, HLD, AAA and hypothyroidism who presents with sepsis (fever and leukocytosis) due to diverticulitis.    Plan:  --stop IVFs and IV ABX, transition to PO ABX.  Await culture results  -- will need outpatient follow up of AAA (follows with Dr. Campoverde)  --pt refuses nicotine patch      I expect patient to be discharged in: 1 day to home    Suze Vergara MD  08/07/17  12:18 PM             Electronically signed by Suze Vergara MD at 8/7/2017 12:20 PM

## 2017-08-07 NOTE — THERAPY EVALUATION
Acute Care - Physical Therapy Initial Evaluation  [facility]     Patient Name: Valente Vargas Jr.  : 1940  MRN: [id number]  Today's Date: 2017   Onset of Illness/Injury or Date of Surgery Date: 17  Date of Referral to PT: 17  Referring Physician: CRYSTAL Parks      Admit Date: 2017     Visit Dx:    ICD-10-CM ICD-9-CM   1. Diverticulitis of large intestine without perforation or abscess without bleeding K57.32 562.11   2. Leukocytosis, unspecified type D72.829 288.60   3. Fever in adult R50.9 780.60   4. Generalized weakness R53.1 780.79   5. Impaired functional mobility, balance, gait, and endurance Z74.09 V49.89   6. Impaired mobility and ADLs Z74.09 799.89     Patient Active Problem List   Diagnosis   • Spinal stenosis at L4-L5 level   • DDD (degenerative disc disease), lumbar   • Gait disturbance   • DDD (degenerative disc disease), cervical   • GERD (gastroesophageal reflux disease)   • Hypothyroidism   • Hyperlipidemia   • Type 2 diabetes mellitus   • Tobacco abuse   • Sepsis due to Diverticulitis   • Abdominal aortic aneurysm   • On supplemental oxygen therapy, nightly   • Hypertension   • Leukocytosis     Past Medical History:   Diagnosis Date   • Diabetes mellitus    • Disease of thyroid gland    • Diverticulitis    • GERD (gastroesophageal reflux disease)    • Hypertension    • Stroke     TIA's-      Past Surgical History:   Procedure Laterality Date   • BACK SURGERY N/A     L5-S1 fusion    • CARDIAC CATHETERIZATION            PT ASSESSMENT (last 72 hours)      PT Evaluation       17 1303 17 1301    Rehab Evaluation    Document Type evaluation  -KINSEY evaluation  -EH    Subjective Information no complaints;agree to therapy  -KINSEY no complaints;agree to therapy  -EH    Patient Effort, Rehab Treatment good  -KINSEY     Symptoms Noted During/After Treatment fatigue  -KINSEY none  -EH    General Information    Patient Profile Review yes  -KINSEY yes  -EH    Onset of Illness/Injury or  "Date of Surgery Date 08/05/17  -KINSEY 08/06/17  -    Referring Physician CRYSTAL Parks  -KINSEY Parks PA-C  -    General Observations Pt. supine in bed with wife present. RA  -KINSEY Pt supine in bed, IV intact.Wife in room  -    Pertinent History Of Current Problem Pt. developed generalized weakness and fatigue with diarrhea.  -KINSEY Pt with sepsis, diverticulitis, AAA, denies TOYIN with decreased saturation at night, weakness, fatigue. \"legs give out\". Hx of L 5-S1 fusion, TIA.  -    Precautions/Limitations  fall precautions;oxygen therapy device and L/min  -    Prior Level of Function  independent:;ADL's;all household mobility   uses walker when going to [employer], slides feet across ground  -    Equipment Currently Used at Home  oxygen;grab bar;walker, rolling  -    Plans/Goals Discussed With  patient;agreed upon  -    Risks Reviewed  patient:;LOB;increased discomfort;change in vital signs;dizziness  -    Benefits Reviewed  patient:;spouse/S.O.:;improve function;increase independence  -    Barriers to Rehab  medically complex;previous functional deficit  -    Living Environment    Lives With  spouse  -    Living Arrangements  house  -    Clinical Impression    Date of Referral to PT  08/06/17  -    PT Diagnosis  decreased functional mobility, decreased independence with mobility, strength decreased in hip flexion  -    Criteria for Skilled Therapeutic Interventions Met  yes;treatment indicated  -    Rehab Potential  good, to achieve stated therapy goals  -    Vital Signs    Post Systolic BP Rehab  --   WNL  -    Pre SpO2 (%)  93  -EH    O2 Delivery Pre Treatment  room air  -    Pain Assessment    Pain Assessment  No/denies pain  -    Cognitive Assessment/Intervention    Current Cognitive/Communication Assessment  functional  -    Orientation Status  oriented x 4  -    Follows Commands/Answers Questions  100% of the time;able to follow single-step instructions  -    Personal Safety  " WNL/WFL  -    Personal Safety Interventions  fall prevention program maintained;gait belt;nonskid shoes/slippers when out of bed  -    ROM (Range of Motion)    General ROM Detail  WFL BLEs  -    MMT (Manual Muscle Testing)    General MMT Assessment Detail  hip flexion 4-/5, remaining 5/5 BLEs  -    Bed Mobility, Assessment/Treatment    Bed Mobility, Scoot/Bridge, Chaves  maximum assist (25% patient effort);2 person assist required  -    Bed Mob, Supine to Sit, Chaves  minimum assist (75% patient effort);2 person assist required  -    Bed Mob, Sit to Supine, Chaves  contact guard assist  -    Transfer Assessment/Treatment    Transfers, Sit-Stand Chaves  minimum assist (75% patient effort);2 person assist required  -    Transfers, Stand-Sit Chaves  minimum assist (75% patient effort);2 person assist required  -    Gait Assessment/Treatment    Gait, Chaves Level  contact guard assist;2 person assist required  -    Gait, Assistive Device  rolling walker  -    Gait, Distance (Feet)  40  -    Gait, Gait Pattern Analysis  swing-through gait  -    Gait, Gait Deviations  toe-to-floor clearance decreased;step length decreased;decreased heel strike;forward flexed posture;bilateral:  -    Gait, Safety Issues  balance decreased during turns  -    Gait, Impairments  strength decreased  -    Gait, Comment  2 laps in room  -    Therapy Exercises    Bilateral Lower Extremities  AROM:;10 reps;hip flexion;LAQ  -    Sensory Assessment/Intervention    Light Touch  LLE;RLE  -EH    LLE Light Touch  WNL  -    RLE Light Touch  [facility]    Positioning and Restraints    Pre-Treatment Position  in bed  -    Post Treatment Position  bed  -    In Bed  notified nsg;supine;call light within reach;encouraged to call for assist;exit alarm on;with family/caregiver  -      08/06/17 0500       General Information    Equipment Currently Used at Home oxygen  -GH     Living  Environment    Lives With spouse  -GH     Living Arrangements house  -GH     Home Accessibility no concerns  -GH     Stair Railings at Home none  -GH     Type of Financial/Environmental Concern none  -GH     Transportation Available car;family or friend will provide  -GH       User Key  (r) = Recorded By, (t) = Taken By, (c) = Cosigned By    Initials Name Provider Type    KINSEY Tran Nash, OT Occupational Therapist     Chloe Draper, PT Physical Therapist     Julia Vergara, RN Registered Nurse          Physical Therapy Education     Title: PT OT SLP Therapies (Active)     Topic: Physical Therapy (Active)     Point: Mobility training (Done)    Learning Progress Summary    Learner Readiness Method Response Comment Documented by Status   Patient Acceptance E VU,NR   08/07/17 1345 Done               Point: Body mechanics (Done)    Learning Progress Summary    Learner Readiness Method Response Comment Documented by Status   Patient Acceptance E VU,NR   08/07/17 1345 Done               Point: Precautions (Done)    Learning Progress Summary    Learner Readiness Method Response Comment Documented by Status   Patient Acceptance E VU,NR   08/07/17 1345 Done                      User Key     Initials Effective Dates Name Provider Type Discipline     06/19/15 -  Chloe Draper, PT Physical Therapist PT                PT Recommendation and Plan  Anticipated Equipment Needs At Discharge: rollator  Anticipated Discharge Disposition: home with home health (dependent upon pt progression)  Planned Therapy Interventions: balance training, bed mobility training, gait training, home exercise program, strengthening, ROM (Range of Motion), transfer training  PT Frequency: daily  Plan of Care Review  Plan Of Care Reviewed With: patient  Outcome Summary/Follow up Plan: Pt ambulates 40 ft in room using RWx and CGA, MIN A x2 for bed mobility, t/f. May need HHPT upon d/c.          IP PT Goals       08/07/17 0958           Bed Mobility PT LTG    Bed Mobility PT LTG, Date Established 08/07/17  -EH      Bed Mobility PT LTG, Time to Achieve 2 wks  -EH      Bed Mobility PT LTG, Activity Type supine to sit/sit to supine  -EH      Bed Mobility PT LTG, [place] Level independent  -EH      Transfer Training PT LTG    Transfer Training PT LTG, Date Established 08/07/17  -EH      Transfer Training PT LTG, Time to Achieve 2 wks  -EH      Transfer Training PT LTG, Activity Type sit to stand/stand to sit  -EH      Transfer Training PT LTG, [place] Level conditional independence  -EH      Transfer Training PT LTG, Assist Device walker, rolling  -EH      Gait Training PT LTG    Gait Training Goal PT LTG, Date Established 08/07/17  -EH      Gait Training Goal PT LTG, Time to Achieve 2 wks  -EH      Gait Training Goal PT LTG, [place] Level conditional independence  -EH      Gait Training Goal PT LTG, Assist Device walker, rolling  -EH      Gait Training Goal PT LTG, Distance to Achieve 350  -EH        User Key  (r) = Recorded By, (t) = Taken By, (c) = Cosigned By    Initials Name Provider Type    TUNDE Draper, PT Physical Therapist                Outcome Measures       08/07/17 1301          How much help from another person do you currently need...    Turning from your back to your side while in flat bed without using bedrails? 3  -EH      Moving from lying on back to sitting on the side of a flat bed without bedrails? 3  -EH      Moving to and from a bed to a chair (including a wheelchair)? 3  -EH      Standing up from a chair using your arms (e.g., wheelchair, bedside chair)? 3  -EH      Climbing 3-5 steps with a railing? 1  -EH      To walk in hospital room? 3  -EH      AM-PAC 6 Clicks Score 16  -EH      Functional Assessment    Outcome Measure Options AM-PAC 6 Clicks Basic Mobility (PT)  -EH        User Key  (r) = Recorded By, (t) = Taken By, (c) = Cosigned By    Initials Name Provider Type    TUNDE Draper, PT  Physical Therapist           Time Calculation:         PT Charges       08/07/17 1349          Time Calculation    Start Time 1301  -      PT Received On 08/07/17  -      PT Goal Re-Cert Due Date 08/17/17  -      Time Calculation- PT    Total Timed Code Minutes- PT 0 minute(s)  -        User Key  (r) = Recorded By, (t) = Taken By, (c) = Cosigned By    Initials Name Provider Type     Chloe Draper, PT Physical Therapist          Therapy Charges for Today     Code Description Service Date Service Provider Modifiers Qty    [id number] HC PT EVAL MOD COMPLEXITY 4 8/7/2017 Chloe Draper, PT GP 1          PT G-Codes  Outcome Measure Options: AM-PAC 6 Clicks Basic Mobility (PT)      Chloe Draper, PT  8/7/2017

## 2017-08-07 NOTE — PROGRESS NOTES
Discharge Planning Assessment  [facility]     Patient Name: Valente Vargas Jr.  MRN: [id number]  Today's Date: 8/7/2017    Admit Date: 8/5/2017          Discharge Needs Assessment       08/07/17 1451    Living Environment    Lives With spouse    Living Arrangements house    Home Accessibility no concerns    Transportation Available car;family or friend will provide    Living Environment    Provides Primary Care For no one    Primary Care Provided By spouse/significant other    Quality Of Family Relationships supportive;helpful;involved    Able to Return to Prior Living Arrangements yes    Discharge Needs Assessment    Concerns To Be Addressed discharge planning concerns    Readmission Within The Last 30 Days no previous admission in last 30 days    Anticipated Changes Related to Illness none    Equipment Currently Used at Home oxygen;grab bar;shower chair;commode;walker, standard;wheelchair    Equipment Needed After Discharge walker, rolling;other (see comments)   Rollator    Current Discharge Risk physical impairment    Discharge Disposition still a patient            Discharge Plan       08/07/17 4156    Case Management/Social Work Plan    Plan Home    Patient/Family In Agreement With Plan yes    Additional Comments Spoke with Mr. Vargas and his wife. He lives with his wife in [facility]. He is mostly independent, but does occasionally use a standard walker. He would like to upgrade to a rollator. No preference of DME company. Walker will be delivered to room by [employer]. He also has additional DME at home, if needed and this includes: wheelchair, cane, bedside commode, and showerchair. He does not require home health services at this time. His plan is to return home with his wife at [place].         Discharge Placement     No information found        Expected Discharge Date and Time     Expected Discharge Date Expected Discharge Time    Aug 8, 2017               Demographic Summary       08/07/17 2066     Referral Information    Admission Type inpatient    Arrived From home or self-care    Referral Source admission list    Reason For Consult discharge planning    Record Reviewed clinical discipline documentation;history and physical    Primary Care Physician Information    Name Mitch Claire            Functional Status       08/07/17 6357    Functional Status Current    Current Functional Level Comment See nursing notes    Change in Functional Status Since Onset of Current Illness/Injury yes    Functional Status Prior    Ambulation 0-->independent    Transferring 0-->independent    Toileting 0-->independent    Bathing 0-->independent    Dressing 0-->independent    Eating 0-->independent    Communication 0-->understands/communicates without difficulty    Swallowing 0-->swallows foods/liquids without difficulty    IADL    Medications independent    Meal Preparation independent    Housekeeping independent    Laundry independent    Shopping independent    Oral Care independent    Activity Tolerance    Current Activity Limitations none    Usual Activity Tolerance good    Current Activity Tolerance moderate    Employment/Financial    Employment/Finance Comments Confirmed with patient that he has [employer] Medicare with Part D coverage through [employer] as well. No complaints regarding medication affordability.             Psychosocial     None            Abuse/Neglect     None            Legal     None            Substance Abuse     None            Patient Forms     None          Shanthi Florez, RN

## 2017-08-07 NOTE — DISCHARGE INSTR - OTHER ORDERS
Your rollator walker has been ordered from [facility]. They can be reached at [phone].

## 2017-08-08 VITALS
BODY MASS INDEX: 25.98 KG/M2 | DIASTOLIC BLOOD PRESSURE: 83 MMHG | RESPIRATION RATE: 18 BRPM | WEIGHT: 196 LBS | HEART RATE: 61 BPM | OXYGEN SATURATION: 95 % | HEIGHT: 73 IN | SYSTOLIC BLOOD PRESSURE: 126 MMHG | TEMPERATURE: 98.4 F

## 2017-08-08 PROBLEM — A41.9 SEPSIS (HCC): Status: RESOLVED | Noted: 2017-08-06 | Resolved: 2017-08-08

## 2017-08-08 PROBLEM — D72.829 LEUKOCYTOSIS: Status: RESOLVED | Noted: 2017-08-06 | Resolved: 2017-08-08

## 2017-08-08 LAB
ANION GAP SERPL CALCULATED.3IONS-SCNC: 3 MMOL/L (ref 3–11)
BACTERIA SPEC AEROBE CULT: NORMAL
BUN BLD-MCNC: 11 MG/DL (ref 9–23)
BUN/CREAT SERPL: 11 (ref 7–25)
CALCIUM SPEC-SCNC: 8.5 MG/DL (ref 8.7–10.4)
CHLORIDE SERPL-SCNC: 110 MMOL/L (ref 99–109)
CO2 SERPL-SCNC: 26 MMOL/L (ref 20–31)
CREAT BLD-MCNC: 1 MG/DL (ref 0.6–1.3)
DEPRECATED RDW RBC AUTO: 46.8 FL (ref 37–54)
ERYTHROCYTE [DISTWIDTH] IN BLOOD BY AUTOMATED COUNT: 13.5 % (ref 11.3–14.5)
GFR SERPL CREATININE-BSD FRML MDRD: 72 ML/MIN/1.73
GLUCOSE BLD-MCNC: 85 MG/DL (ref 70–100)
HCT VFR BLD AUTO: 40.3 % (ref 38.9–50.9)
HGB BLD-MCNC: 13.9 G/DL (ref 13.1–17.5)
MCH RBC QN AUTO: 32.2 PG (ref 27–31)
MCHC RBC AUTO-ENTMCNC: 34.5 G/DL (ref 32–36)
MCV RBC AUTO: 93.3 FL (ref 80–99)
PLATELET # BLD AUTO: 173 10*3/MM3 (ref 150–450)
PMV BLD AUTO: 10.2 FL (ref 6–12)
POTASSIUM BLD-SCNC: 3.5 MMOL/L (ref 3.5–5.5)
RBC # BLD AUTO: 4.32 10*6/MM3 (ref 4.2–5.76)
SODIUM BLD-SCNC: 139 MMOL/L (ref 132–146)
WBC NRBC COR # BLD: 8.97 10*3/MM3 (ref 3.5–10.8)

## 2017-08-08 PROCEDURE — 99239 HOSP IP/OBS DSCHRG MGMT >30: CPT | Performed by: NURSE PRACTITIONER

## 2017-08-08 PROCEDURE — 85027 COMPLETE CBC AUTOMATED: CPT | Performed by: INTERNAL MEDICINE

## 2017-08-08 PROCEDURE — 80048 BASIC METABOLIC PNL TOTAL CA: CPT | Performed by: INTERNAL MEDICINE

## 2017-08-08 RX ORDER — METRONIDAZOLE 500 MG/1
500 TABLET ORAL EVERY 6 HOURS SCHEDULED
Qty: 28 TABLET | Refills: 0 | Status: SHIPPED | OUTPATIENT
Start: 2017-08-08

## 2017-08-08 RX ORDER — LEVOFLOXACIN 750 MG/1
750 TABLET ORAL
Qty: 7 TABLET | Refills: 0 | Status: SHIPPED | OUTPATIENT
Start: 2017-08-08 | End: 2017-08-15

## 2017-08-08 RX ADMIN — DIVALPROEX SODIUM 500 MG: 500 TABLET, DELAYED RELEASE ORAL at 08:52

## 2017-08-08 RX ADMIN — LEVOFLOXACIN 750 MG: 750 TABLET, FILM COATED ORAL at 12:09

## 2017-08-08 RX ADMIN — METRONIDAZOLE 500 MG: 500 TABLET ORAL at 05:20

## 2017-08-08 RX ADMIN — CITALOPRAM HYDROBROMIDE 40 MG: 20 TABLET ORAL at 08:52

## 2017-08-08 RX ADMIN — METRONIDAZOLE 500 MG: 500 TABLET ORAL at 12:09

## 2017-08-08 RX ADMIN — PANTOPRAZOLE SODIUM 40 MG: 40 TABLET, DELAYED RELEASE ORAL at 08:54

## 2017-08-08 RX ADMIN — CLOPIDOGREL BISULFATE 75 MG: 75 TABLET ORAL at 08:53

## 2017-08-08 RX ADMIN — LEVOTHYROXINE SODIUM 50 MCG: 50 TABLET ORAL at 05:20

## 2017-08-08 RX ADMIN — CARVEDILOL 12.5 MG: 12.5 TABLET, FILM COATED ORAL at 08:54

## 2017-08-08 RX ADMIN — RAMIPRIL 10 MG: 2.5 CAPSULE ORAL at 08:53

## 2017-08-08 NOTE — DISCHARGE SUMMARY
[facility] Medicine Services  DISCHARGE SUMMARY       Date of Admission: 8/5/2017  Date of Discharge:  8/8/2017  Primary Care Physician: Mitch Claire MD  Consulting Physician(s)          None         Discharge Diagnoses:  Active Hospital Problems (** Indicates Principal Problem)    Diagnosis Date Noted   • GERD (gastroesophageal reflux disease) [K21.9] 08/06/2017   • Hypothyroidism [E03.9] 08/06/2017   • Hyperlipidemia [E78.5] 08/06/2017   • Type 2 diabetes mellitus [E11.9] 08/06/2017   • Tobacco abuse [Z72.0] 08/06/2017   • Abdominal aortic aneurysm [I71.4] 08/06/2017   • On supplemental oxygen therapy, nightly [Z99.81] 08/06/2017   • Hypertension [I10] 08/06/2017   • DDD (degenerative disc disease), cervical [M50.30] 06/13/2017   • DDD (degenerative disc disease), lumbar [M51.36] 05/19/2016     W/ lateral luxation         Resolved Hospital Problems    Diagnosis Date Noted Date Resolved   • **Sepsis due to Diverticulitis [A41.9] 08/06/2017 08/08/2017   • Leukocytosis [D72.829] 08/06/2017 08/08/2017     Presenting Problem/History of Present Illness  Diverticulitis of large intestine without perforation or abscess without bleeding [K57.32]     Chief Complaint on Day of Discharge: weakness    History of Present Illness on Day of Discharge:   Patient is lying in bed without any new complaints or issues.  Diarrhea has improved.  C. difficile was negative.  No nausea or vomiting.  States he is ready to go home.  Weakness has improved.  Denies chest pain, shortness of breath or abdominal pain.  Son is at bedside.  No fever or chills.  Hospital Course  Patient is a 76 y.o. male with past medical history significant for GERD, hyperlipidemia, DDD, hypothyroid, type 2 diabetes, and probable OSH.  He presented with complaints of progressive worsening generalized weakness and fatigue.  He reported on day of admission he was getting up from a chair when he nearly fell because his legs  suddenly gave out.  In ED, patient was noted to be febrile with temperature 101.9.  Elevated lactic acid at 1.2.  Urinalysis and chest x-ray were negative.  CT of abdomen and pelvis was impressive for diverticulitis at the level of the descending colon.  He was also noted to have a known abdominal aortic aneurysm measuring 4.9 cm in diameter.  Patient admitted to the hospital medicine service with diagnosis of sepsis due to diverticulitis.  Patient's son reported that a few years ago he had similar episode and was noted to likely have microperforation at that time, partial colectomy was suggested but ultimately not pursued.  Patient was treated with IV antibiotics and transition to by mouth.  He has improved clinically and is ambulating without assistance.  Leukocytosis has resolved.  He will continue Levaquin and Flagyl at discharge for a total treatment course of 10 days.  He will follow up with his PCP within 1 week or sooner if needed for hospital follow up.  PT/OT recommended possible home health at discharge but patient refused stating that he didn't feel like he needed it at this time.  Discharge plans and instructions were reviewed with patient and he verbalized an understanding.      Procedures Performed:  none       Consults:   Consults     No orders found from 7/7/2017 to 8/6/2017.        Pertinent Test Results:    Results from last 7 days  Lab Units 08/08/17  0612 08/07/17  0659 08/06/17  0855   WBC 10*3/mm3 8.97 8.79 12.57*   HEMOGLOBIN g/dL 13.9 13.9 14.1   HEMATOCRIT % 40.3 41.1 41.4   PLATELETS 10*3/mm3 173 170 162       Results from last 7 days  Lab Units 08/08/17  0612 08/07/17  0659 08/06/17  0855   SODIUM mmol/L 139 143 134   POTASSIUM mmol/L 3.5 3.6 4.0   CHLORIDE mmol/L 110* 110* 105   CO2 mmol/L 26.0 24.0 24.0   BUN mg/dL 11 9 13   CREATININE mg/dL 1.00 1.10 1.00   GLUCOSE mg/dL 85 91 110*   CALCIUM mg/dL 8.5* 8.7 8.5*     Imaging Results (last 72 hours)     Procedure Component Value Units  Date/Time    XR Chest 1 View [[id number]]  (Abnormal) Collected:  08/05/17 1919     Updated:  08/05/17 2120    Narrative:       EXAM:    XR Chest, 1 View    CLINICAL HISTORY:    76 years, male; Signs and symptoms; Fever; Additional info: Weakness/fever    TECHNIQUE:    Frontal view of the chest.    COMPARISON:    CR - XR CHEST PA AND LATERAL 1/14/2013 1:00:59 PM    FINDINGS:    Lungs:  Unremarkable.  No consolidation.    Pleural space:  Unremarkable.  No pneumothorax.    Heart:  Unremarkable.  No cardiomegaly.    Mediastinum:  Unremarkable.    Bones/joints:  No acute osseous findings.    Vasculature:  Atherosclerotic calcifications are visualized within the aorta.      Impression:         No acute findings visualized in the chest.     THIS DOCUMENT HAS BEEN ELECTRONICALLY SIGNED BY APOLINAR JENNINGS MD    MRI Lumbar Spine Without Contrast [[id number]]  (Abnormal) Collected:  08/05/17 1922     Updated:  08/05/17 2218    Narrative:       EXAM:    MR Lumbar Spine Without Intravenous Contrast    CLINICAL HISTORY:    76 years, male; Signs and symptoms; Weakness; Prior surgery; Surgery date: 6+   months; Surgery type: Fusion; Patient HX: Leg weakness/spinal stenosis lbo x 3   years lumbar surgery in 1980s - fusion    TECHNIQUE:    Magnetic resonance images of the lumbar spine without intravenous contrast in   multiple planes.    COMPARISON:    CT LUMBAR SPINE WO CONTRAST 5/9/2016 10:23:55 AM    FINDINGS:    Vertebrae:  Discogenic degenerative changes visualized within the vertebral   body endplates at L4-5.  No acute fracture.  No suspicious osseous lesion.    Spinal cord: Distal spinal cord appears unremarkable.  Normal signal.    Soft tissues:  Minimal nonspecific subcutaneous stranding at the level lower   back.  No fluid collections visualized.    Vasculature:  Abdominal aortic aneurysm again noted.  This is incompletely   imaged and cannot be fully evaluated.    Kidneys and ureters:  Small left renal cyst.     DISCS/SPINAL  CANAL/NEURAL FORAMINA:    L1-L2:  At L1-2, there is no significant disc disease.  Facet hypertrophy is   noted.  No spinal canal or neural foraminal narrowing.    L2-L3:  At L2-3, no significant disc disease.  Facet hypertrophy noted.  No   significant spinal canal or neural foraminal narrowing.    L3-L4:  At L3-4, there is a broad-based posterior disc bulge and facet   hypertrophy.  Small amount of fluid noted in the bilateral facet joints at this   level.  There is associated mild spinal canal narrowing.  Mild bilateral neural   foraminal narrowing.    L4-L5:  At L4-5, there is prominent intervertebral disc height loss with a   broad-based posterior disc bulge and facet hypertrophy.  Mild ligamentum flavum   hypertrophy.  Mild to moderate spinal canal narrowing.  Moderate bilateral   neural foraminal narrowing.  This appears similar to the prior study.    L5-S1:  At L5-S1, there is prominent intervertebral disc height loss.    Minimal broad-based posterior disc bulge.  No significant spinal canal   narrowing.  Mild left neural foraminal narrowing and moderate right neural   foraminal narrowing.      Impression:       Degenerative changes of the lumbar spine, most prominent at L4-5.  No   significant interval change appreciated.    THIS DOCUMENT HAS BEEN ELECTRONICALLY SIGNED BY APOLINAR JENNINGS MD    CT Abdomen Pelvis Without Contrast [[id number]]  (Abnormal) Collected:  08/05/17 2216     Updated:  08/05/17 2307    Narrative:       EXAM:    CT Abdomen and Pelvis Without Intravenous Contrast    CLINICAL HISTORY:    76 years, male; Signs and symptoms; Other: Weakness; Prior surgery;   Additional info: Abd pain    TECHNIQUE:    Axial computed tomography images of the abdomen and pelvis without   intravenous contrast.  This CT exam was performed using one or more of the   following dose reduction techniques:  automated exposure control, adjustment of   the mA and/or kV according to patient size, and/or use of iterative    reconstruction technique.    Coronal reformatted images were created and reviewed.    COMPARISON:    CT LUMBAR SPINE WO CONTRAST 5/9/2016 10:23:55 AM    FINDINGS:    Lower thorax:  There is mild subsegmental atelectasis and/or scarring in the   lung bases.     ABDOMEN:    Liver:  Unremarkable. No obvious liver masses appreciated on this   non-contrast exam.    Gallbladder and bile ducts:  The gallbladder is surgically absent. No   significant biliary ductal dilatation appreciated.    Pancreas:  Unremarkable.  No ductal dilation.    Spleen:  Unremarkable.  No splenomegaly.    Adrenals:  Unremarkable.  No adrenal nodules or masses identified.    Kidneys and ureters:  Left renal cysts.  Small hyperdense lesions in the   right kidney measuring up to 1.4 cm are favored to represent hyperdense cysts.    No hydronephrosis.  Punctate bilateral intrarenal stones visualized.  No   ureteral stones.  There is bilateral perinephric stranding which may be chronic.    Stomach and bowel:  No bowel obstruction. Colonic diverticulosis is noted.    There is also focal wall thickening and mild inflammatory changes at the level   of the descending colon, most compatible with diverticulitis.      Appendix:  The appendix is unremarkable.     PELVIS:    Bladder:  Unremarkable.  No stones.    Reproductive:  The prostate is mildly enlarged.     ABDOMEN and PELVIS:    Intraperitoneal space:   No free air.  Trace free fluid in the left paracolic   gutter.  No focal fluid collection to suggest abscess.    Bones/joints:  Degenerative changes of the spine. No acute fracture.  No   dislocation.    Soft tissues:  Tiny fat-containing ventral hernia at the level of the upper   abdomen.  There is also a small fat containing umbilical hernia.  Small fat   containing right inguinal hernia.    Vasculature:  Atherosclerotic calcifications.  There is an infrarenal aortic   aneurysm.  This measures 4.9 x 4.9 cm in the AP and transverse dimensions.  The  "  transverse diameter is not significantly changed from the prior study.  The AP   diameter on the prior exam is incompletely imaged.  This aneurysm extends   approximately 9.5 cm in the craniocaudal dimension.  No obvious signs of aortic   rupture.  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    Lymph nodes:  No significant lymph node enlargement.      Impression:       1.  Findings compatible with diverticulitis at the level of the descending   colon. No evidence of associated perforation or abscess.   2.  Abdominal aortic aneurysm measuring 4.9 cm in diameter.  No findings to   suggest aortic rupture.    THIS DOCUMENT HAS BEEN ELECTRONICALLY SIGNED BY APOLINAR JENNINGS MD        Condition on Discharge:  stable    Physical Exam on Discharge:/75 (BP Location: Right arm, Patient Position: Lying)  Pulse 58  Temp 97.7 °F (36.5 °C) (Oral)   Resp 18  Ht 73\" (185.4 cm)  Wt 196 lb (88.9 kg)  SpO2 95%  BMI 25.86 kg/m2  Physical Exam  Gen-no acute distress  CV-RRR, S1 S2 normal, no m/r/g  Resp-CTAB, no wheezes  Abd-soft, NT, ND, +BS  Ext-no edema  Neuro-A&Ox3, no focal deficits  Psych-appropriate mood    Discharge Disposition  Home or Self Care    Discharge Medications   Valente Vargas Jr.   Home Medication Instructions DERKE:[id number]    Printed on:08/08/17 1102   Medication Information                      carvedilol (COREG) 12.5 MG tablet  Take 12.5 mg by mouth Daily.             citalopram (CeleXA) 40 MG tablet  Take 40 mg by mouth daily.             clopidogrel (PLAVIX) 75 MG tablet  Take 75 mg by mouth daily.             Cyanocobalamin (VITAMIN B 12 PO)  Take 1,000 mg by mouth daily.             divalproex (DEPAKOTE) 500 MG EC tablet  Take 500 mg by mouth 2 (two) times a day.             lansoprazole (PREVACID) 30 MG capsule  Take 30 mg by mouth daily.             levoFLOXacin (LEVAQUIN) 750 MG tablet  Take 1 tablet by mouth Daily for 7 days. Indications: Infection Within the Abdomen           "   levothyroxine (SYNTHROID, LEVOTHROID) 50 MCG tablet  Take 50 mcg by mouth daily.             Lutein 20 MG capsule  Take  by mouth daily.             metroNIDAZOLE (FLAGYL) 500 MG tablet  Take 1 tablet by mouth Every 6 (Six) Hours. Indications: Upper Respiratory Tract Infection             O2 (OXYGEN)  Inhale 2 L/min 1 (One) Time. PT WEARS O2 AT NIGHT             QUEtiapine XR (SEROquel XR) 300 MG 24 hr tablet  Take 300 mg by mouth every night.             ramipril (ALTACE) 10 MG capsule  Take 10 mg by mouth daily.             rosuvastatin (CRESTOR) 40 MG tablet                 Discharge Diet:   Diet Instructions     Diet: Regular, Cardiac       Discharge Diet:   Regular  Cardiac      Gi/soft bland               Discharge Care Plan / Instructions:    Activity at Discharge:   Activity Instructions     Activity as Tolerated                   Follow-up Appointments  No future appointments.  Additional Instructions for the Follow-ups that You Need to Schedule     Discharge Follow-up with PCP    As directed    Follow Up Details:  within 1 week for hospital follow up               Test Results Pending at Discharge   Order Current Status    Blood Culture Preliminary result    Blood Culture Preliminary result           WASHINGTON Kruse 08/08/17 11:02 AM    Time: Discharge 40 min    Please note that portions of this note may have been completed with a voice recognition program. Efforts were made to edit the dictations, but occasionally words are mistranscribed.

## 2017-08-11 LAB
BACTERIA SPEC AEROBE CULT: NORMAL
BACTERIA SPEC AEROBE CULT: NORMAL

## 2017-08-12 NOTE — THERAPY DISCHARGE NOTE
Acute Care - Physical Therapy Discharge Summary  [facility]       Patient Name: Valente Vargas Jr.  : 1940  MRN: [id number]    Today's Date: 2017  Onset of Illness/Injury or Date of Surgery Date: 17    Date of Referral to PT: 17  Referring Physician: CRYSTAL Parks      Admit Date: 2017      PT Recommendation and Plan    Visit Dx:    ICD-10-CM ICD-9-CM   1. Diverticulitis of large intestine without perforation or abscess without bleeding K57.32 562.11   2. Leukocytosis, unspecified type D72.829 288.60   3. Fever in adult R50.9 780.60   4. Generalized weakness R53.1 780.79   5. Impaired functional mobility, balance, gait, and endurance Z74.09 V49.89   6. Impaired mobility and ADLs Z74.09 799.89   7. Gait disturbance R26.9 781.2                       IP PT Goals       17 1346          Bed Mobility PT LTG    Bed Mobility PT LTG, Date Established 17  -      Bed Mobility PT LTG, Time to Achieve 2 wks  -EH      Bed Mobility PT LTG, Activity Type supine to sit/sit to supine  -      Bed Mobility PT LTG, [place] Level independent  -EH      Transfer Training PT LTG    Transfer Training PT LTG, Date Established 17  -      Transfer Training PT LTG, Time to Achieve 2 wks  -EH      Transfer Training PT LTG, Activity Type sit to stand/stand to sit  -EH      Transfer Training PT LTG, [place] Level conditional independence  -EH      Transfer Training PT LTG, Assist Device walker, rolling  -EH      Gait Training PT LTG    Gait Training Goal PT LTG, Date Established 17  -      Gait Training Goal PT LTG, Time to Achieve 2 wks  -EH      Gait Training Goal PT LTG, [place] Level conditional independence  -EH      Gait Training Goal PT LTG, Assist Device walker, rolling  -EH      Gait Training Goal PT LTG, Distance to Achieve 350  -EH        User Key  (r) = Recorded By, (t) = Taken By, (c) = Cosigned By    Initials Name Provider Type     Chloe Draper, PT  Physical Therapist            Goals Status: Treatment plan discontinued secondary to discharge from acute facility.    PT Discharge Summary  Reason for Discharge: Discharge from facility  Outcomes Achieved: Refer to plan of care for updates on goals achieved  Discharge Destination: Home with assist      Sue Hannon, PT   8/12/2017

## 2017-08-12 NOTE — THERAPY DISCHARGE NOTE
Acute Care - Occupational Therapy Discharge Summary  [facility]     Patient Name: Valente Vargas Jr.  : 1940  MRN: [id number]    Today's Date: 2017  Onset of Illness/Injury or Date of Surgery Date: 17    Date of Referral to OT: 17  Referring Physician: CRYSTAL Parks      Admit Date: 2017        OT Recommendation and Plan    Visit Dx:    ICD-10-CM ICD-9-CM   1. Diverticulitis of large intestine without perforation or abscess without bleeding K57.32 562.11   2. Leukocytosis, unspecified type D72.829 288.60   3. Fever in adult R50.9 780.60   4. Generalized weakness R53.1 780.79   5. Impaired functional mobility, balance, gait, and endurance Z74.09 V49.89   6. Impaired mobility and ADLs Z74.09 799.89   7. Gait disturbance R26.9 781.2                     OT Goals       17 1358          Bed Mobility OT LTG    Bed Mobility OT LTG, Date Established 17  -KINSEY      Bed Mobility OT LTG, Time to Achieve 1 wk  -KINSEY      Bed Mobility OT LTG, Activity Type all bed mobility  -KINSEY      Bed Mobility OT LTG, [place] Level supervision required  -KINSEY      Bed Mobility OT LTG, Assist Device bed rails  -KINSEY      Bed Mobility OT LTG, Outcome goal ongoing  -KINSEY      Transfer Training OT LTG    Transfer Training OT LTG, Date Established 17  -KINSEY      Transfer Training OT LTG, Time to Achieve 1 wk  -KINSEY      Transfer Training OT LTG, Activity Type sit to stand/stand to sit;toilet;bed to chair /chair to bed  -KINSEY      Transfer Training OT LTG, [place] Level supervision required  -KINSEY      Transfer Training OT LTG, Assist Device walker, rolling;commode, bedside  -KINSEY      Transfer Training OT LTG, Outcome goal ongoing  -KINSEY      Strength OT LTG    Strength Goal OT LTG, Date Established 17  -KINSEY      Strength Goal OT LTG, Time to Achieve 1 wk  -KINSEY      Strength Goal OT LTG, Measure to Achieve BUE 15-20 reps each session to build strength and activity tolerance to support ADL and mobility  indep   -KINSEY      Strength Goal OT LTG, Outcome goal ongoing  -KINSEY      Patient Education OT LTG    Patient Education OT LTG, Date Established 08/07/17  -KINSEY      Patient Education OT LTG, Time to Achieve 1 wk  -KINSEY      Patient Education OT LTG, Education Type written program;HEP;posture/body mechanics;home safety  -KINSEY      Patient Education OT LTG, Education Understanding verbalizes understanding  -KINSEY      Patient Education OT LTG Outcome goal ongoing  -KINSEY      Functional Mobility OT LTG    Functional Mobility Goal OT LTG, Date Established 08/07/17  -KINSEY      Functional Mobility Goal OT LTG, Time to Achieve 1 wk  -KINSEY      Functional Mobility Goal OT LTG, [place] Level supervision  -KINSEY      Functional Mobility Goal OT LTG, Assist Device rolling walker  -KINSEY      Functional Mobility Goal OT LTG, Distance to Achieve to the bathroom   distance in pt. home from all areas  -KINSEY      Functional Mobility Goal OT LTG, Outcome goal ongoing  -KINSEY        User Key  (r) = Recorded By, (t) = Taken By, (c) = Cosigned By    Initials Name Provider Type    KINSEY Nash, OT Occupational Therapist                  OT Discharge Summary  Reason for Discharge: Discharge from facility  Outcomes Achieved: Refer to plan of care for updates on goals achieved  Discharge Destination: Home      Kourtney Baron OT  8/12/2017